# Patient Record
Sex: FEMALE | Race: WHITE | Employment: FULL TIME | URBAN - NONMETROPOLITAN AREA
[De-identification: names, ages, dates, MRNs, and addresses within clinical notes are randomized per-mention and may not be internally consistent; named-entity substitution may affect disease eponyms.]

---

## 2017-12-07 ENCOUNTER — HOSPITAL ENCOUNTER (EMERGENCY)
Dept: CT IMAGING | Age: 42
Discharge: HOME | End: 2017-12-07
Payer: COMMERCIAL

## 2017-12-07 ENCOUNTER — HOSPITAL ENCOUNTER (EMERGENCY)
Age: 42
Discharge: HOME | End: 2017-12-07
Payer: COMMERCIAL

## 2017-12-07 VITALS
RESPIRATION RATE: 18 BRPM | BODY MASS INDEX: 24.8 KG/M2 | WEIGHT: 140 LBS | DIASTOLIC BLOOD PRESSURE: 74 MMHG | HEART RATE: 101 BPM | TEMPERATURE: 98.6 F | HEIGHT: 63 IN | OXYGEN SATURATION: 97 % | SYSTOLIC BLOOD PRESSURE: 137 MMHG

## 2017-12-07 DIAGNOSIS — N30.01 ACUTE CYSTITIS WITH HEMATURIA: Primary | ICD-10-CM

## 2017-12-07 LAB
BASOPHILS # BLD: 0.2 %
BASOPHILS ABSOLUTE: 0 THOU/MM3 (ref 0–0.1)
BILIRUBIN URINE: ABNORMAL
BLOOD, URINE: ABNORMAL
BUN, WHOLE BLOOD: 11 MG/DL (ref 8–26)
CHARACTER, URINE: ABNORMAL
CHLORIDE, WHOLE BLOOD: 99 MEQ/L (ref 98–109)
COLOR: ABNORMAL
CREAT SERPL-MCNC: 0.8 MG/DL (ref 0.5–1.2)
EOSINOPHIL # BLD: 0.1 %
EOSINOPHILS ABSOLUTE: 0 THOU/MM3 (ref 0–0.4)
GFR, ESTIMATED: 84 ML/MIN/1.73M2
GLUCOSE, URINE: NEGATIVE MG/DL
GLUCOSE, WHOLE BLOOD: 146 MG/DL (ref 70–108)
HCT VFR BLD CALC: 40.8 % (ref 37–47)
HEMOGLOBIN: 14.1 GM/DL (ref 12–16)
KETONES, URINE: 15
LEUKOCYTES, UA: ABNORMAL
LYMPHOCYTES # BLD: 3 %
LYMPHOCYTES ABSOLUTE: 0.5 THOU/MM3 (ref 1–4.8)
MCH RBC QN AUTO: 32 PG (ref 27–31)
MCHC RBC AUTO-ENTMCNC: 34.5 GM/DL (ref 33–37)
MCV RBC AUTO: 93 FL (ref 81–99)
MONOCYTES # BLD: 3.1 %
MONOCYTES ABSOLUTE: 0.5 THOU/MM3 (ref 0.4–1.3)
NITRITE, URINE: NEGATIVE
NUCLEATED RED BLOOD CELLS: 0 /100 WBC
PDW BLD-RTO: 11.5 % (ref 11.5–14.5)
PH UA: 5.5 (ref 5–9)
PLATELET # BLD: 179 THOU/MM3 (ref 130–400)
PMV BLD AUTO: 7.9 MCM (ref 7.4–10.4)
POTASSIUM, WHOLE BLOOD: 3.2 MEQ/L (ref 3.5–4.9)
PROTEIN UA: 100 MG/DL
RBC # BLD: 4.4 MILL/MM3 (ref 4.2–5.4)
REFLEX TO URINE C & S: ABNORMAL
SEG NEUTROPHILS: 93.6 %
SEGMENTED NEUTROPHILS ABSOLUTE COUNT: 14.4 THOU/MM3 (ref 1.8–7.7)
SODIUM BLD-SCNC: 137 MEQ/L (ref 138–146)
SPECIFIC GRAVITY UA: >= 1.03 (ref 1–1.03)
TOTAL CO2, WHOLE BLOOD: 27 MEQ/L (ref 23–33)
UROBILINOGEN, URINE: 2 EU/DL (ref 0–1)
WBC # BLD: 15.4 THOU/MM3 (ref 4.8–10.8)

## 2017-12-07 PROCEDURE — 2580000003 HC RX 258: Performed by: NURSE PRACTITIONER

## 2017-12-07 PROCEDURE — 99213 OFFICE O/P EST LOW 20 MIN: CPT | Performed by: NURSE PRACTITIONER

## 2017-12-07 PROCEDURE — 74177 CT ABD & PELVIS W/CONTRAST: CPT

## 2017-12-07 PROCEDURE — 99215 OFFICE O/P EST HI 40 MIN: CPT

## 2017-12-07 PROCEDURE — 85025 COMPLETE CBC W/AUTO DIFF WBC: CPT

## 2017-12-07 PROCEDURE — 87086 URINE CULTURE/COLONY COUNT: CPT

## 2017-12-07 PROCEDURE — 82947 ASSAY GLUCOSE BLOOD QUANT: CPT

## 2017-12-07 PROCEDURE — 84520 ASSAY OF UREA NITROGEN: CPT

## 2017-12-07 PROCEDURE — 6360000002 HC RX W HCPCS: Performed by: NURSE PRACTITIONER

## 2017-12-07 PROCEDURE — 82565 ASSAY OF CREATININE: CPT

## 2017-12-07 PROCEDURE — 36415 COLL VENOUS BLD VENIPUNCTURE: CPT

## 2017-12-07 PROCEDURE — 80051 ELECTROLYTE PANEL: CPT

## 2017-12-07 PROCEDURE — 81003 URINALYSIS AUTO W/O SCOPE: CPT

## 2017-12-07 PROCEDURE — 96374 THER/PROPH/DIAG INJ IV PUSH: CPT

## 2017-12-07 PROCEDURE — 96375 TX/PRO/DX INJ NEW DRUG ADDON: CPT

## 2017-12-07 PROCEDURE — 6360000004 HC RX CONTRAST MEDICATION: Performed by: NURSE PRACTITIONER

## 2017-12-07 RX ORDER — KETOROLAC TROMETHAMINE 30 MG/ML
30 INJECTION, SOLUTION INTRAMUSCULAR; INTRAVENOUS ONCE
Status: COMPLETED | OUTPATIENT
Start: 2017-12-07 | End: 2017-12-07

## 2017-12-07 RX ORDER — NITROFURANTOIN 25; 75 MG/1; MG/1
100 CAPSULE ORAL 2 TIMES DAILY
Qty: 14 CAPSULE | Refills: 0 | Status: SHIPPED | OUTPATIENT
Start: 2017-12-07 | End: 2017-12-07

## 2017-12-07 RX ORDER — ONDANSETRON 2 MG/ML
4 INJECTION INTRAMUSCULAR; INTRAVENOUS ONCE
Status: COMPLETED | OUTPATIENT
Start: 2017-12-07 | End: 2017-12-07

## 2017-12-07 RX ORDER — LEVOFLOXACIN 500 MG/1
500 TABLET, FILM COATED ORAL DAILY
Qty: 7 TABLET | Refills: 0 | Status: SHIPPED | OUTPATIENT
Start: 2017-12-07 | End: 2017-12-14

## 2017-12-07 RX ORDER — AZITHROMYCIN 250 MG/1
TABLET, FILM COATED ORAL
Qty: 6 TABLET | Refills: 0 | Status: SHIPPED | OUTPATIENT
Start: 2017-12-07 | End: 2017-12-07

## 2017-12-07 RX ADMIN — IOHEXOL 50 ML: 240 INJECTION, SOLUTION INTRATHECAL; INTRAVASCULAR; INTRAVENOUS; ORAL at 16:19

## 2017-12-07 RX ADMIN — ONDANSETRON 4 MG: 2 INJECTION INTRAMUSCULAR; INTRAVENOUS at 14:57

## 2017-12-07 RX ADMIN — IOPAMIDOL 85 ML: 755 INJECTION, SOLUTION INTRAVENOUS at 16:19

## 2017-12-07 RX ADMIN — CEFTRIAXONE 1 G: 1 INJECTION, POWDER, FOR SOLUTION INTRAMUSCULAR; INTRAVENOUS at 15:20

## 2017-12-07 RX ADMIN — KETOROLAC TROMETHAMINE 30 MG: 30 INJECTION, SOLUTION INTRAMUSCULAR at 14:57

## 2017-12-07 ASSESSMENT — ENCOUNTER SYMPTOMS
RECTAL PAIN: 0
SHORTNESS OF BREATH: 0
ABDOMINAL PAIN: 1
ANAL BLEEDING: 0
CONSTIPATION: 0
VOMITING: 0
NAUSEA: 1
BLOOD IN STOOL: 0
DIARRHEA: 0
SORE THROAT: 0
WHEEZING: 0
COUGH: 0

## 2017-12-07 ASSESSMENT — PAIN SCALES - GENERAL
PAINLEVEL_OUTOF10: 10

## 2017-12-07 ASSESSMENT — PAIN DESCRIPTION - LOCATION: LOCATION: ABDOMEN

## 2017-12-07 ASSESSMENT — PAIN DESCRIPTION - DESCRIPTORS: DESCRIPTORS: ACHING;STABBING

## 2017-12-07 ASSESSMENT — PAIN DESCRIPTION - ORIENTATION: ORIENTATION: RIGHT;LOWER;MID

## 2017-12-07 ASSESSMENT — PAIN DESCRIPTION - PAIN TYPE: TYPE: ACUTE PAIN

## 2017-12-07 NOTE — ED PROVIDER NOTES
[address]  Urgent Care Encounter      CHIEF COMPLAINT       Chief Complaint   Patient presents with    Abdominal Pain       Nurses Notes reviewed and I agree except as noted in the HPI. HISTORY OF PRESENT ILLNESS   Abelino Spann is a 40 y.o. female who presents With onset of abdominal pain mostly in the right lower quadrant, and nausea starting yesterday. States she has not been able to eat all day, but she is drinking well. Denies vomiting or diarrhea, constipation, unusual flatulence or belching. She has no dysuria, urinary frequency or burning, no hematuria, no vaginal discharge. She has an IUD in place and has not had a period for quite some time. REVIEW OF SYSTEMS     Review of Systems   Constitutional: Negative for appetite change, diaphoresis, fatigue and fever. HENT: Negative for congestion, sore throat and tinnitus. Eyes: Negative for visual disturbance. Respiratory: Negative for cough, shortness of breath and wheezing. Cardiovascular: Negative for chest pain and leg swelling. Gastrointestinal: Positive for abdominal pain and nausea. Negative for anal bleeding, blood in stool, constipation, diarrhea, rectal pain and vomiting. Genitourinary: Negative for frequency. Musculoskeletal: Negative for arthralgias, myalgias and neck stiffness. Skin: Negative for rash. Neurological: Negative for dizziness, weakness and headaches. Psychiatric/Behavioral: The patient is not nervous/anxious. All other systems reviewed and are negative. PAST MEDICAL HISTORY         Diagnosis Date    Abnormal Pap smear     Depression     Mental disorder     history of depression       SURGICAL HISTORY     Patient  has a past surgical history that includes knee surgery (2007). CURRENT MEDICATIONS       Previous Medications    ACETAMINOPHEN 650 MG TABS    Take 650 mg by mouth every 4 hours as needed for Pain (For mild pain level 1-3 or for fever > 100.5). Abdominal: Soft. Bowel sounds are normal. She exhibits no distension and no mass. There is tenderness. There is rebound and guarding. Musculoskeletal: Normal range of motion. She exhibits no edema or tenderness. Neurological: She is alert and oriented to person, place, and time. She displays no tremor. Coordination normal. GCS eye subscore is 4. GCS verbal subscore is 5. GCS motor subscore is 6. Skin: Skin is warm and dry. No rash noted. She is not diaphoretic. No erythema. No pallor. Psychiatric: She has a normal mood and affect. Her speech is normal and behavior is normal. Judgment and thought content normal. Cognition and memory are normal.   Nursing note and vitals reviewed.       DIAGNOSTIC RESULTS   Labs:  Results for orders placed or performed during the hospital encounter of 12/07/17   UA without Microscopic Reflex C&S   Result Value Ref Range    Glucose, Urine Negative NEGATIVE mg/dl    Bilirubin Urine Moderate (A) NEGATIVE    Ketones, Urine 15 (A) NEGATIVE    Specific Gravity, UA >=1.030 1.002 - 1.03    Blood, Urine Large (A) NEGATIVE    pH, UA 5.50 5.0 - 9.0    Protein,  (A) NEGATIVE mg/dl    Urobilinogen, Urine 2.00 0.0 - 1.0 eu/dl    Nitrite, Urine Negative NEGATIVE    LEUKOCYTES, UA Small (A) NEGATIVE    Color, UA Other STRAW-YELL    Character, Urine Cloudy (A) CLEAR-SL C    REFLEX TO URINE C & S INDICATED    CBC Auto Differential   Result Value Ref Range    WBC 15.4 (H) 4.8 - 10.8 thou/mm3    RBC 4.40 4.20 - 5.40 mill/mm3    Hemoglobin 14.1 12.0 - 16.0 gm/dl    Hematocrit 40.8 37.0 - 47.0 %    MCV 93 81 - 99 fL    MCH 32.0 (H) 27.0 - 31.0 pg    MCHC 34.5 33.0 - 37.0 gm/dl    RDW 11.5 11.5 - 14.5 %    Platelets 562 488 - 068 thou/mm3    MPV 7.9 7.4 - 10.4 mcm   POC Basic Metabol Panel without Calcium   Result Value Ref Range    Sodium 137 (L) 138 - 146 meq/l    Potassium, Whole Blood 3.2 (L) 3.5 - 4.9 meq/l    Chloride, Whole Blood 99 98 - 109 meq/l    TOTAL CO2, WHOLE BLOOD 27 23 - 33 meq/l    Glucose, Whole Blood 146 (H) 70 - 108 mg/dl    BUN, WHOLE BLOOD 11 8 - 26 mg/dl    CREATININE 0.8 0.5 - 1.2 mg/dl   Glomerular Filtration Rate, Estimated   Result Value Ref Range    GFR, Estimated 84 ml/min/1.73m2       IMAGING:  CT ABDOMEN PELVIS W IV CONTRAST   Final Result   NO CT EVIDENCE OF APPENDICITIS. NONSPECIFIC CHARACTERISTICS WITH RESPECT TO THE COMPLAINTS OF RIGHT LOWER QUADRANT PAIN. INCIDENTAL FINDINGS OF SIGMOID COLONIC DIVERTICULOSIS, POSSIBLE LEFT OVARIAN CYST, AS WELL AS INTRAUTERINE DEVICE. BORDERLINE HEPATOMEGALY ALSO. **This report has been created using voice recognition software. It may contain minor errors which are inherent in voice recognition technology. **            Final report electronically signed by Dr. Makeda Fonseca on 12/7/2017 4:28 PM          URGENT CARE COURSE:     Vitals:    12/07/17 1429   BP: 137/74   Pulse: 101   Resp: 18   Temp: 98.6 °F (37 °C)   TempSrc: Oral   SpO2: 97%   Weight: 140 lb (63.5 kg)   Height: 5' 3\" (1.6 m)       Patient presents in obvious pain, lying on her left side complaining of pain in the right lower quadrant. She has her right knee pulled up to her chest.  Her most intensive pain is at McBurney's point, but she does have pain generally throughout her abdomen. Skin is warm and dry and she is afebrile. Bowel sounds are mildly hyperactive. Positive heeltap sign. Urinalysis indicates UTI and presence of bilirubin. White blood count-15.4  Sodium 137, potassium 3.2    Patient is reassessed to find her pain is unchanged, she continues to have right lower quadrant pain, but she does have associated CVA tenderness on the right side now. The rest of her abdomen is also generally tender to palpation. States she has had UTIs in the past but never anything like this. Patient is submitted to a CT scan of the abdomen to ensure there is no appendicitis, due to her presentation. CT results were not positive for appendicitis.

## 2017-12-07 NOTE — ED NOTES
Patient verbalized understanding of discharge instructions and medications prescribed. Denies questions or concerns at this time.       Edwige Grace RN  12/07/17 9589

## 2017-12-08 ENCOUNTER — NURSE TRIAGE (OUTPATIENT)
Dept: ADMINISTRATIVE | Age: 42
End: 2017-12-08

## 2017-12-09 LAB
ORGANISM: ABNORMAL
URINE CULTURE REFLEX: ABNORMAL

## 2017-12-26 ENCOUNTER — HOSPITAL ENCOUNTER (INPATIENT)
Age: 42
LOS: 5 days | Discharge: HOME | DRG: 389 | End: 2017-12-31
Attending: INTERNAL MEDICINE | Admitting: INTERNAL MEDICINE
Payer: COMMERCIAL

## 2017-12-26 ENCOUNTER — APPOINTMENT (OUTPATIENT)
Dept: CT IMAGING | Age: 42
DRG: 389 | End: 2017-12-26
Payer: COMMERCIAL

## 2017-12-26 DIAGNOSIS — K52.9 ENTERITIS: Primary | ICD-10-CM

## 2017-12-26 DIAGNOSIS — N28.9 ACUTE RENAL INSUFFICIENCY: ICD-10-CM

## 2017-12-26 PROBLEM — N17.9 AKI (ACUTE KIDNEY INJURY) (HCC): Status: ACTIVE | Noted: 2017-12-26

## 2017-12-26 PROBLEM — K56.7 ILEUS (HCC): Status: ACTIVE | Noted: 2017-12-26

## 2017-12-26 LAB
ADENOVIRUS F 40 41 PCR: NOT DETECTED
ALBUMIN SERPL-MCNC: 4.3 G/DL (ref 3.5–5.1)
ALP BLD-CCNC: 93 U/L (ref 38–126)
ALT SERPL-CCNC: 7 U/L (ref 11–66)
AMORPHOUS: ABNORMAL
AMPHETAMINE+METHAMPHETAMINE URINE SCREEN: NEGATIVE
ANION GAP SERPL CALCULATED.3IONS-SCNC: 20 MEQ/L (ref 8–16)
ANISOCYTOSIS: ABNORMAL
AST SERPL-CCNC: 10 U/L (ref 5–40)
ASTROVIRUS PCR: NOT DETECTED
BACTERIA: ABNORMAL /HPF
BANDED NEUTROPHILS ABSOLUTE COUNT: 1.4 THOU/MM3
BANDS PRESENT: 6 %
BARBITURATE QUANTITATIVE URINE: NEGATIVE
BASOPHILS # BLD: 0 %
BASOPHILS ABSOLUTE: 0 THOU/MM3 (ref 0–0.1)
BENZODIAZEPINE QUANTITATIVE URINE: NEGATIVE
BILIRUB SERPL-MCNC: 0.5 MG/DL (ref 0.3–1.2)
BILIRUBIN DIRECT: < 0.2 MG/DL (ref 0–0.3)
BILIRUBIN URINE: ABNORMAL
BLOOD, URINE: ABNORMAL
BUN BLDV-MCNC: 35 MG/DL (ref 7–22)
C-REACTIVE PROTEIN: 56.39 MG/DL (ref 0–1)
CALCIUM SERPL-MCNC: 9.9 MG/DL (ref 8.5–10.5)
CAMPYLOBACTER PCR: NOT DETECTED
CANNABINOID QUANTITATIVE URINE: NEGATIVE
CASTS UA: ABNORMAL /LPF
CHARACTER, URINE: ABNORMAL
CHLORIDE BLD-SCNC: 98 MEQ/L (ref 98–111)
CLOSTRIDIUM DIFFICILE, PCR: NOT DETECTED
CO2: 18 MEQ/L (ref 23–33)
COCAINE METABOLITE QUANTITATIVE URINE: NEGATIVE
COLOR: YELLOW
CREAT SERPL-MCNC: 1.8 MG/DL (ref 0.4–1.2)
CRYPTOSPORIDIUM PCR: NOT DETECTED
CRYSTALS, UA: ABNORMAL
CYCLOSPORA CAYETANENSIS PCR: NOT DETECTED
DIFFERENTIAL, MANUAL: NORMAL
E COLI 0157 PCR: NORMAL
E COLI ENTEROAGGREGATIVE PCR: NOT DETECTED
E COLI ENTEROPATHOGENIC PCR: NOT DETECTED
E COLI ENTEROTOXIGENIC PCR: NOT DETECTED
E COLI SHIGA LIKE TOXIN PCR: NOT DETECTED
E COLI SHIGELLA/ENTEROINVASIVE PCR: NOT DETECTED
E HISTOLYTICA GI FILM ARRAY: NOT DETECTED
EOSINOPHIL # BLD: 0 %
EOSINOPHILS ABSOLUTE: 0 THOU/MM3 (ref 0–0.4)
EPITHELIAL CELLS, UA: ABNORMAL /HPF
ETHYL ALCOHOL, SERUM: < 0.01 %
GFR SERPL CREATININE-BSD FRML MDRD: 31 ML/MIN/1.73M2
GIARDIA LAMBLIA PCR: NOT DETECTED
GLUCOSE BLD-MCNC: 140 MG/DL (ref 70–108)
GLUCOSE URINE: NEGATIVE MG/DL
HCT VFR BLD CALC: 45.8 % (ref 37–47)
HEMOGLOBIN: 15.6 GM/DL (ref 12–16)
ICTOTEST: NEGATIVE
KETONES, URINE: NEGATIVE
LEUKOCYTE ESTERASE, URINE: ABNORMAL
LIPASE: 6.3 U/L (ref 5.6–51.3)
LYMPHOCYTES # BLD: 7 %
LYMPHOCYTES ABSOLUTE: 1.7 THOU/MM3 (ref 1–4.8)
MAGNESIUM: 2.2 MG/DL (ref 1.6–2.4)
MCH RBC QN AUTO: 31.2 PG (ref 27–31)
MCHC RBC AUTO-ENTMCNC: 34 GM/DL (ref 33–37)
MCV RBC AUTO: 91.9 FL (ref 81–99)
MONOCYTES # BLD: 1 %
MONOCYTES ABSOLUTE: 0.2 THOU/MM3 (ref 0.4–1.3)
MUCUS: ABNORMAL
NITRITE, URINE: NEGATIVE
NOROVIRUS GI GII PCR: NOT DETECTED
NUCLEATED RED BLOOD CELLS: 0 /100 WBC
OPIATES, URINE: NEGATIVE
OSMOLALITY CALCULATION: 282.2 MOSMOL/KG (ref 275–300)
OXYCODONE: NEGATIVE
PDW BLD-RTO: 14.6 % (ref 11.5–14.5)
PH UA: 5
PHENCYCLIDINE QUANTITATIVE URINE: NEGATIVE
PLATELET # BLD: 238 THOU/MM3 (ref 130–400)
PLATELET ESTIMATE: ADEQUATE
PLESIOMONAS SHIGELLOIDES PCR: NOT DETECTED
PMV BLD AUTO: 9.2 MCM (ref 7.4–10.4)
POTASSIUM SERPL-SCNC: 3.7 MEQ/L (ref 3.5–5.2)
PROTEIN UA: 30
RBC # BLD: 4.98 MILL/MM3 (ref 4.2–5.4)
RBC URINE: ABNORMAL /HPF
ROTAVIRUS A PCR: NOT DETECTED
SALMONELLA PCR: NOT DETECTED
SAPOVIRUS PCR: NOT DETECTED
SEG NEUTROPHILS: 86 %
SEGMENTED NEUTROPHILS ABSOLUTE COUNT: 20.5 THOU/MM3 (ref 1.8–7.7)
SODIUM BLD-SCNC: 136 MEQ/L (ref 135–145)
SPECIFIC GRAVITY, URINE: 1.02 (ref 1–1.03)
TOTAL PROTEIN: 8.3 G/DL (ref 6.1–8)
UROBILINOGEN, URINE: 0.2 EU/DL
VIBRIO CHOLERAE PCR: NOT DETECTED
VIBRIO PCR: NOT DETECTED
WBC # BLD: 23.8 THOU/MM3 (ref 4.8–10.8)
WBC UA: ABNORMAL /HPF
YERSINIA ENTEROCOLITICA PCR: NOT DETECTED

## 2017-12-26 PROCEDURE — 87086 URINE CULTURE/COLONY COUNT: CPT

## 2017-12-26 PROCEDURE — 81001 URINALYSIS AUTO W/SCOPE: CPT

## 2017-12-26 PROCEDURE — 36415 COLL VENOUS BLD VENIPUNCTURE: CPT

## 2017-12-26 PROCEDURE — 6360000002 HC RX W HCPCS: Performed by: INTERNAL MEDICINE

## 2017-12-26 PROCEDURE — 82248 BILIRUBIN DIRECT: CPT

## 2017-12-26 PROCEDURE — 99285 EMERGENCY DEPT VISIT HI MDM: CPT

## 2017-12-26 PROCEDURE — 99222 1ST HOSP IP/OBS MODERATE 55: CPT | Performed by: INTERNAL MEDICINE

## 2017-12-26 PROCEDURE — 6370000000 HC RX 637 (ALT 250 FOR IP): Performed by: INTERNAL MEDICINE

## 2017-12-26 PROCEDURE — 6360000004 HC RX CONTRAST MEDICATION: Performed by: NURSE PRACTITIONER

## 2017-12-26 PROCEDURE — 83735 ASSAY OF MAGNESIUM: CPT

## 2017-12-26 PROCEDURE — G0480 DRUG TEST DEF 1-7 CLASSES: HCPCS

## 2017-12-26 PROCEDURE — 2500000003 HC RX 250 WO HCPCS: Performed by: INTERNAL MEDICINE

## 2017-12-26 PROCEDURE — 74177 CT ABD & PELVIS W/CONTRAST: CPT

## 2017-12-26 PROCEDURE — 2580000003 HC RX 258: Performed by: NURSE PRACTITIONER

## 2017-12-26 PROCEDURE — 80307 DRUG TEST PRSMV CHEM ANLYZR: CPT

## 2017-12-26 PROCEDURE — 85025 COMPLETE CBC W/AUTO DIFF WBC: CPT

## 2017-12-26 PROCEDURE — 6360000002 HC RX W HCPCS: Performed by: NURSE PRACTITIONER

## 2017-12-26 PROCEDURE — 83690 ASSAY OF LIPASE: CPT

## 2017-12-26 PROCEDURE — S0028 INJECTION, FAMOTIDINE, 20 MG: HCPCS | Performed by: INTERNAL MEDICINE

## 2017-12-26 PROCEDURE — 86140 C-REACTIVE PROTEIN: CPT

## 2017-12-26 PROCEDURE — 6370000000 HC RX 637 (ALT 250 FOR IP): Performed by: NURSE PRACTITIONER

## 2017-12-26 PROCEDURE — 1200000000 HC SEMI PRIVATE

## 2017-12-26 PROCEDURE — 80053 COMPREHEN METABOLIC PANEL: CPT

## 2017-12-26 PROCEDURE — 87507 IADNA-DNA/RNA PROBE TQ 12-25: CPT

## 2017-12-26 RX ORDER — 0.9 % SODIUM CHLORIDE 0.9 %
500 INTRAVENOUS SOLUTION INTRAVENOUS ONCE
Status: COMPLETED | OUTPATIENT
Start: 2017-12-26 | End: 2017-12-26

## 2017-12-26 RX ORDER — ONDANSETRON 2 MG/ML
4 INJECTION INTRAMUSCULAR; INTRAVENOUS EVERY 6 HOURS PRN
Status: DISCONTINUED | OUTPATIENT
Start: 2017-12-26 | End: 2017-12-31 | Stop reason: HOSPADM

## 2017-12-26 RX ORDER — PAROXETINE HYDROCHLORIDE 20 MG/1
10 TABLET, FILM COATED ORAL DAILY
Status: DISCONTINUED | OUTPATIENT
Start: 2017-12-26 | End: 2017-12-31 | Stop reason: HOSPADM

## 2017-12-26 RX ORDER — METHYLPREDNISOLONE SODIUM SUCCINATE 40 MG/ML
40 INJECTION, POWDER, LYOPHILIZED, FOR SOLUTION INTRAMUSCULAR; INTRAVENOUS DAILY
Status: DISCONTINUED | OUTPATIENT
Start: 2017-12-26 | End: 2017-12-27

## 2017-12-26 RX ORDER — SODIUM CHLORIDE 9 MG/ML
INJECTION, SOLUTION INTRAVENOUS CONTINUOUS
Status: DISCONTINUED | OUTPATIENT
Start: 2017-12-26 | End: 2017-12-26

## 2017-12-26 RX ORDER — HYDROCODONE BITARTRATE AND ACETAMINOPHEN 5; 325 MG/1; MG/1
1 TABLET ORAL ONCE
Status: COMPLETED | OUTPATIENT
Start: 2017-12-26 | End: 2017-12-26

## 2017-12-26 RX ORDER — MORPHINE SULFATE 2 MG/ML
2 INJECTION, SOLUTION INTRAMUSCULAR; INTRAVENOUS
Status: DISPENSED | OUTPATIENT
Start: 2017-12-26 | End: 2017-12-27

## 2017-12-26 RX ORDER — CIPROFLOXACIN 2 MG/ML
400 INJECTION, SOLUTION INTRAVENOUS EVERY 24 HOURS
Status: DISCONTINUED | OUTPATIENT
Start: 2017-12-27 | End: 2017-12-26

## 2017-12-26 RX ORDER — SODIUM CHLORIDE 0.9 % (FLUSH) 0.9 %
10 SYRINGE (ML) INJECTION EVERY 12 HOURS SCHEDULED
Status: DISCONTINUED | OUTPATIENT
Start: 2017-12-26 | End: 2017-12-31 | Stop reason: HOSPADM

## 2017-12-26 RX ORDER — METRONIDAZOLE 500 MG/1
500 TABLET ORAL ONCE
Status: DISCONTINUED | OUTPATIENT
Start: 2017-12-26 | End: 2017-12-26

## 2017-12-26 RX ORDER — ACETAMINOPHEN 325 MG/1
650 TABLET ORAL EVERY 4 HOURS PRN
Status: DISCONTINUED | OUTPATIENT
Start: 2017-12-26 | End: 2017-12-31 | Stop reason: HOSPADM

## 2017-12-26 RX ORDER — SODIUM CHLORIDE 9 MG/ML
INJECTION, SOLUTION INTRAVENOUS CONTINUOUS
Status: ACTIVE | OUTPATIENT
Start: 2017-12-26 | End: 2017-12-27

## 2017-12-26 RX ORDER — POTASSIUM CHLORIDE 20 MEQ/1
40 TABLET, EXTENDED RELEASE ORAL PRN
Status: DISCONTINUED | OUTPATIENT
Start: 2017-12-26 | End: 2017-12-31 | Stop reason: HOSPADM

## 2017-12-26 RX ORDER — HYDROCODONE BITARTRATE AND ACETAMINOPHEN 5; 325 MG/1; MG/1
1 TABLET ORAL EVERY 4 HOURS PRN
Status: DISCONTINUED | OUTPATIENT
Start: 2017-12-26 | End: 2017-12-31 | Stop reason: HOSPADM

## 2017-12-26 RX ORDER — CIPROFLOXACIN 2 MG/ML
400 INJECTION, SOLUTION INTRAVENOUS ONCE
Status: DISCONTINUED | OUTPATIENT
Start: 2017-12-26 | End: 2017-12-26

## 2017-12-26 RX ORDER — POTASSIUM CHLORIDE 20MEQ/15ML
40 LIQUID (ML) ORAL PRN
Status: DISCONTINUED | OUTPATIENT
Start: 2017-12-26 | End: 2017-12-31 | Stop reason: HOSPADM

## 2017-12-26 RX ORDER — 0.9 % SODIUM CHLORIDE 0.9 %
1000 INTRAVENOUS SOLUTION INTRAVENOUS ONCE
Status: COMPLETED | OUTPATIENT
Start: 2017-12-26 | End: 2017-12-26

## 2017-12-26 RX ORDER — SODIUM CHLORIDE 0.9 % (FLUSH) 0.9 %
10 SYRINGE (ML) INJECTION PRN
Status: DISCONTINUED | OUTPATIENT
Start: 2017-12-26 | End: 2017-12-31 | Stop reason: HOSPADM

## 2017-12-26 RX ORDER — POTASSIUM CHLORIDE 7.45 MG/ML
10 INJECTION INTRAVENOUS PRN
Status: DISCONTINUED | OUTPATIENT
Start: 2017-12-26 | End: 2017-12-31 | Stop reason: HOSPADM

## 2017-12-26 RX ORDER — DICYCLOMINE HYDROCHLORIDE 10 MG/1
10 CAPSULE ORAL ONCE
Status: COMPLETED | OUTPATIENT
Start: 2017-12-26 | End: 2017-12-26

## 2017-12-26 RX ADMIN — DICYCLOMINE HYDROCHLORIDE 10 MG: 10 CAPSULE ORAL at 10:23

## 2017-12-26 RX ADMIN — SODIUM CHLORIDE 1000 ML: 9 INJECTION, SOLUTION INTRAVENOUS at 11:43

## 2017-12-26 RX ADMIN — METHYLPREDNISOLONE SODIUM SUCCINATE 40 MG: 40 INJECTION, POWDER, FOR SOLUTION INTRAMUSCULAR; INTRAVENOUS at 18:18

## 2017-12-26 RX ADMIN — HYDROCODONE BITARTRATE AND ACETAMINOPHEN 1 TABLET: 5; 325 TABLET ORAL at 10:23

## 2017-12-26 RX ADMIN — ONDANSETRON 4 MG: 2 INJECTION INTRAMUSCULAR; INTRAVENOUS at 18:24

## 2017-12-26 RX ADMIN — MORPHINE SULFATE 2 MG: 2 INJECTION, SOLUTION INTRAMUSCULAR; INTRAVENOUS at 20:08

## 2017-12-26 RX ADMIN — CIPROFLOXACIN 400 MG: 2 INJECTION, SOLUTION INTRAVENOUS at 13:09

## 2017-12-26 RX ADMIN — SODIUM CHLORIDE 500 ML: 9 INJECTION, SOLUTION INTRAVENOUS at 10:15

## 2017-12-26 RX ADMIN — SODIUM CHLORIDE: 9 INJECTION, SOLUTION INTRAVENOUS at 13:09

## 2017-12-26 RX ADMIN — Medication 30 ML: at 18:53

## 2017-12-26 RX ADMIN — IOPAMIDOL 80 ML: 755 INJECTION, SOLUTION INTRAVENOUS at 12:12

## 2017-12-26 RX ADMIN — ENOXAPARIN SODIUM 40 MG: 40 INJECTION SUBCUTANEOUS at 16:09

## 2017-12-26 RX ADMIN — PAROXETINE HYDROCHLORIDE 10 MG: 20 TABLET, FILM COATED ORAL at 16:09

## 2017-12-26 RX ADMIN — FAMOTIDINE 20 MG: 10 INJECTION, SOLUTION INTRAVENOUS at 18:18

## 2017-12-26 ASSESSMENT — PAIN DESCRIPTION - PAIN TYPE
TYPE: ACUTE PAIN

## 2017-12-26 ASSESSMENT — PAIN SCALES - GENERAL
PAINLEVEL_OUTOF10: 5
PAINLEVEL_OUTOF10: 5
PAINLEVEL_OUTOF10: 7
PAINLEVEL_OUTOF10: 5
PAINLEVEL_OUTOF10: 6
PAINLEVEL_OUTOF10: 5
PAINLEVEL_OUTOF10: 7
PAINLEVEL_OUTOF10: 5
PAINLEVEL_OUTOF10: 3
PAINLEVEL_OUTOF10: 6

## 2017-12-26 ASSESSMENT — ENCOUNTER SYMPTOMS
SORE THROAT: 0
ABDOMINAL DISTENTION: 0
VOICE CHANGE: 0
CHEST TIGHTNESS: 0
EYE REDNESS: 0
VOMITING: 0
RHINORRHEA: 0
PHOTOPHOBIA: 0
NAUSEA: 0
COUGH: 0
SINUS PRESSURE: 0
ABDOMINAL PAIN: 1
BLOOD IN STOOL: 0
WHEEZING: 0
COLOR CHANGE: 0
CONSTIPATION: 0
SHORTNESS OF BREATH: 0
DIARRHEA: 1
BACK PAIN: 1

## 2017-12-26 ASSESSMENT — PAIN DESCRIPTION - FREQUENCY
FREQUENCY: CONTINUOUS

## 2017-12-26 ASSESSMENT — PAIN DESCRIPTION - DESCRIPTORS
DESCRIPTORS: CONSTANT
DESCRIPTORS: ACHING

## 2017-12-26 ASSESSMENT — PAIN DESCRIPTION - LOCATION
LOCATION: ABDOMEN
LOCATION: HEAD
LOCATION: ABDOMEN

## 2017-12-26 ASSESSMENT — PAIN DESCRIPTION - ORIENTATION: ORIENTATION: RIGHT;LEFT;LOWER;UPPER

## 2017-12-26 NOTE — PROGRESS NOTES
Dr. Marina Meier called this RN and was made aware of consult. He stated that someone from GI group would see patient in the morning.

## 2017-12-26 NOTE — ED PROVIDER NOTES
is unknown.    family history includes Miscarriages / Stillbirths in her maternal grandmother. SOCIAL HISTORY      reports that she has been smoking. She has a 4.50 pack-year smoking history. She has never used smokeless tobacco. She reports that she drinks alcohol. She reports that she does not use drugs. PHYSICAL EXAM     INITIAL VITALS:  height is 5' 3\" (1.6 m) and weight is 139 lb 6.4 oz (63.2 kg). Her oral temperature is 97.7 °F (36.5 °C). Her blood pressure is 113/66 and her pulse is 81. Her respiration is 16 and oxygen saturation is 98%. Physical Exam   Constitutional: She is oriented to person, place, and time. She appears well-developed and well-nourished. HENT:   Head: Normocephalic. Right Ear: External ear normal.   Left Ear: External ear normal.   Mouth/Throat: Uvula is midline and oropharynx is clear and moist.   Eyes: Conjunctivae and EOM are normal. Pupils are equal, round, and reactive to light. Neck: Normal range of motion. Neck supple. Cardiovascular: Normal rate, regular rhythm, S1 normal, S2 normal, normal heart sounds and intact distal pulses. Pulmonary/Chest: Effort normal and breath sounds normal. No respiratory distress. She exhibits no tenderness. Abdominal: Soft. Normal appearance and bowel sounds are normal. She exhibits no distension. There is tenderness (diffuse). Musculoskeletal: Normal range of motion. Lymphadenopathy:     She has no cervical adenopathy. Neurological: She is alert and oriented to person, place, and time. Skin: Skin is warm, dry and intact. Psychiatric: She has a normal mood and affect. Her speech is normal and behavior is normal. Thought content normal.   Nursing note and vitals reviewed.       DIFFERENTIAL DIAGNOSIS:   Infectious diarrhea, colitis, diverticulitis, appendicitis, pancreatitis, cholelithiasis, cholecystitis     DIAGNOSTIC RESULTS     EKG: All EKG's are interpreted by the Emergency Department Physician who either signs or Co-signs this chart in the absence of a cardiologist.     None    RADIOLOGY: non-plain film images(s) such as CT, Ultrasound and MRI are read by the radiologist.  Plain radiographic images are visualized and preliminarily interpreted by the emergency physician unless otherwise stated below. CT ABDOMEN PELVIS W IV CONTRAST Additional Contrast? Oral   Final Result   INTERVAL WORSENING IN THE APPEARANCE OF THE ABDOMEN, PREDOMINATELY WITH RESPECT TO THE BOWEL, NOW WITH FLUID ATTENUATION THROUGH THE MAJORITY OF THE BOWEL LUMEN AS WELL AS MINIMAL DEVELOPMENT OF BOWEL DISTENTION INVOLVING THE SMALL BOWEL,    ESPECIALLY THE JEJUNUM AND ILEUM, DIAMETER OF AS MUCH AS 3.5 CM. CHARACTERISTICS OF ILEUS VERSUS LESS LIKELY SMALL BOWEL OBSTRUCTION. CHARACTERISTICS OF ENTERITIS ALSO. **This report has been created using voice recognition software. It may contain minor errors which are inherent in voice recognition technology. **            Final report electronically signed by Dr. Georgiana Burk on 12/26/2017 12:30 PM      XR ABDOMEN (KUB) (SINGLE AP VIEW)    (Results Pending)         LABS:   Labs Reviewed   CBC WITH AUTO DIFFERENTIAL - Abnormal; Notable for the following:        Result Value    WBC 23.8 (*)     MCH 31.2 (*)     RDW 14.6 (*)     Segs Absolute 20.5 (*)     Monocytes # 0.2 (*)     All other components within normal limits   C-REACTIVE PROTEIN - Abnormal; Notable for the following:     CRP 56.39 (*)     All other components within normal limits   BASIC METABOLIC PANEL - Abnormal; Notable for the following:     CO2 18 (*)     Glucose 140 (*)     BUN 35 (*)     CREATININE 1.8 (*)     All other components within normal limits   HEPATIC FUNCTION PANEL - Abnormal; Notable for the following:     ALT 7 (*)     Total Protein 8.3 (*)     All other components within normal limits   ANION GAP - Abnormal; Notable for the following:      Anion Gap 20.0 (*)     All other components within normal limits   GLOMERULAR

## 2017-12-26 NOTE — H&P
History & Physical      PCP: No primary care provider on file. Date of Admission: 12/26/2017    Date of Service: 12/26/17    Chief Complaint:  Stomach pain    History Of Present Illness:    History obtained from chart review and the patient. 40 y.o. female who presented to [facility] with complaint of abd pain - sharp/cramping in nature and worse with movement. She has had some loose stools, chills, but no nausea or emesis. She previously had a similar episode 19 days ago, went to urgent care, had CT was diagnosed with UTI only and given antibiotics. CT at that time didn't show any inflammation. When seen she notes continued pain as well as some esophageal pain, burning sensation. Past Medical History:          Diagnosis Date    Abnormal Pap smear     Depression     Mental disorder     history of depression       Past Surgical History:          Procedure Laterality Date    KNEE SURGERY  2007       Medications Prior to Admission:      Prior to Admission medications    Medication Sig Start Date End Date Taking? Authorizing Provider   ondansetron (ZOFRAN ODT) 4 MG disintegrating tablet Take 1 tablet by mouth every 4 hours as needed for Nausea 7/5/15   Judith Abbasi MD   PARoxetine (PAXIL) 10 MG tablet Take 10 mg by mouth daily as needed    Historical Provider, MD   acetaminophen 650 MG TABS Take 650 mg by mouth every 4 hours as needed for Pain (For mild pain level 1-3 or for fever > 100.5). 10/21/12   Andi Colin MD   ibuprofen (ADVIL;MOTRIN) 200 MG tablet Take 4 tablets by mouth every 8 hours as needed for Pain (For mild pain level 1-3). 10/21/12   Andi Colin MD       Allergies:  Review of patient's allergies indicates no known allergies. Social History:      The patient currently lives at home    TOBACCO:   reports that she has been smoking. She has a 4.50 pack-year smoking history. She has never used smokeless tobacco.  ETOH:   reports that she drinks alcohol.       Family History:      Reviewed in detail. Positive as follows:        Problem Relation Age of Onset    Miscarriages / Stillbirths Maternal Grandmother     Early Death Neg Hx        REVIEW OF SYSTEMS:   10 point review of system performed, pertinent positives as noted in the HPI, all other systems negative/at baseline. PHYSICAL EXAM:    /79   Pulse 73   Temp 98.1 °F (36.7 °C) (Oral)   Resp 16   Ht 5' 3\" (1.6 m)   Wt 137 lb (62.1 kg)   SpO2 100%   BMI 24.27 kg/m²       General appearance:  No apparent distress, appears stated age and cooperative. HEENT:  Normal cephalic, atraumatic without obvious deformity. Pupils equal, round, and reactive to light. Extra ocular muscles intact. Conjunctivae/corneas clear. Neck: Supple, with full range of motion. No jugular venous distention. Trachea midline. Respiratory:  Normal respiratory effort. Clear to auscultation, bilaterally without Rales/Wheezes/Rhonchi. Cardiovascular:  Regular rate and rhythm with normal S1/S2 without murmurs, rubs or gallops. Abdomen: Soft, non-tender, non-distended with normal bowel sounds. Musculoskeletal:  No clubbing, cyanosis or edema bilaterally. Full range of motion without deformity. Skin: Skin color, texture, turgor normal.  No rashes or lesions. Neurologic:  Neurovascularly intact without any focal sensory/motor deficits. Cranial nerves: II-XII intact, grossly non-focal.  Psychiatric:  Alert and oriented, thought content appropriate, normal insight  Capillary Refill: Brisk,< 3 seconds   Peripheral Pulses: +2 palpable, equal bilaterally       Labs:     Recent Labs      12/26/17   1009   WBC  23.8*   HGB  15.6   HCT  45.8   PLT  238     Recent Labs      12/26/17   1009   NA  136   K  3.7   CL  98   CO2  18*   BUN  35*   CREATININE  1.8*   CALCIUM  9.9     Recent Labs      12/26/17   1009   AST  10   ALT  7*   BILIDIR  <0.2   BILITOT  0.5   ALKPHOS  93     No results for input(s): INR in the last 72 hours.   No results for input(s): Tyshawn Thornton in the last 72 hours. Urinalysis:      Lab Results   Component Value Date    NITRU NEGATIVE 12/26/2017    WBCUA 5-10 12/26/2017    BACTERIA NONE 12/26/2017    RBCUA 15-20 12/26/2017    BLOODU LARGE 12/26/2017    SPECGRAV >=1.030 12/07/2017    GLUCOSEU NEGATIVE 12/26/2017       Radiology:   I have reviewed the imaging studies with the following interpretation:  No results found. Diet:  DIET CLEAR LIQUID;    DVT prophylaxis: [x] Lovenox                                 [] SCDs                                 [] SQ Heparin                                 [] Encourage ambulation           [] Already on Anticoagulation    Code Status: Full Code      PT/OT Eval Status: no    Disposition:    [x] Home       [] TCU       [] Rehab       [] Psych       [] SNF       [] [place]       [] Other-       Assessment and Plan:    Principal Problem:    Enteritis  Active Problems:    Ileus ([facility], [place] [address]        · Admit for enteritis with ileus  · Clear liquid diet as tolerated - if emesis then will go NPO  · KUB in the AM  · Cipro and flagyl, follow GI PCR panel  · IVF NS at 100mL/hr  · PRN morphine or norco  · IV solumedrol (CRP is 50+ but GI panel is unrevealing, leading to possibility of inflammatory bowel disease) and will consult GI  · Heating pad, GI cocktail x1 dose, IV pepcid x1 dose      Thank you No primary care provider on file. for the opportunity to be involved in this patient's care.     Electronically signed by Sergio Paul DO on 12/26/2017 at 12:46 PM

## 2017-12-27 ENCOUNTER — APPOINTMENT (OUTPATIENT)
Dept: GENERAL RADIOLOGY | Age: 42
DRG: 389 | End: 2017-12-27
Payer: COMMERCIAL

## 2017-12-27 LAB
ANION GAP SERPL CALCULATED.3IONS-SCNC: 11 MEQ/L (ref 8–16)
ANISOCYTOSIS: ABNORMAL
BACTERIA: ABNORMAL /HPF
BASOPHILS # BLD: 0 %
BASOPHILS ABSOLUTE: 0 THOU/MM3 (ref 0–0.1)
BILIRUBIN URINE: NEGATIVE
BLOOD, URINE: ABNORMAL
BUN BLDV-MCNC: 19 MG/DL (ref 7–22)
CALCIUM SERPL-MCNC: 8.7 MG/DL (ref 8.5–10.5)
CASTS 2: ABNORMAL /LPF
CASTS UA: ABNORMAL /LPF
CHARACTER, URINE: ABNORMAL
CHLORIDE BLD-SCNC: 102 MEQ/L (ref 98–111)
CO2: 22 MEQ/L (ref 23–33)
COLOR: YELLOW
CREAT SERPL-MCNC: 0.7 MG/DL (ref 0.4–1.2)
CRYSTALS, UA: ABNORMAL
EOSINOPHIL # BLD: 0 %
EOSINOPHILS ABSOLUTE: 0 THOU/MM3 (ref 0–0.4)
EPITHELIAL CELLS, UA: ABNORMAL /HPF
GFR SERPL CREATININE-BSD FRML MDRD: > 90 ML/MIN/1.73M2
GLUCOSE BLD-MCNC: 147 MG/DL (ref 70–108)
GLUCOSE URINE: NEGATIVE MG/DL
HCT VFR BLD CALC: 34 % (ref 37–47)
HCT VFR BLD CALC: 36.9 % (ref 37–47)
HEMOGLOBIN: 11.5 GM/DL (ref 12–16)
HEMOGLOBIN: 12.7 GM/DL (ref 12–16)
KETONES, URINE: NEGATIVE
LEUKOCYTE ESTERASE, URINE: ABNORMAL
LYMPHOCYTES # BLD: 7 %
LYMPHOCYTES ABSOLUTE: 0.6 THOU/MM3 (ref 1–4.8)
MCH RBC QN AUTO: 30.9 PG (ref 27–31)
MCH RBC QN AUTO: 32.1 PG (ref 27–31)
MCHC RBC AUTO-ENTMCNC: 33.8 GM/DL (ref 33–37)
MCHC RBC AUTO-ENTMCNC: 34.4 GM/DL (ref 33–37)
MCV RBC AUTO: 91.4 FL (ref 81–99)
MCV RBC AUTO: 93.3 FL (ref 81–99)
MISCELLANEOUS 2: ABNORMAL
MONOCYTES # BLD: 1.6 %
MONOCYTES ABSOLUTE: 0.1 THOU/MM3 (ref 0.4–1.3)
NITRITE, URINE: NEGATIVE
NUCLEATED RED BLOOD CELLS: 0 /100 WBC
ORGANISM: ABNORMAL
PDW BLD-RTO: 13.9 % (ref 11.5–14.5)
PDW BLD-RTO: 14.6 % (ref 11.5–14.5)
PH UA: 6
PLATELET # BLD: 196 THOU/MM3 (ref 130–400)
PLATELET # BLD: 222 THOU/MM3 (ref 130–400)
PMV BLD AUTO: 9.6 MCM (ref 7.4–10.4)
PMV BLD AUTO: 9.8 MCM (ref 7.4–10.4)
POTASSIUM SERPL-SCNC: 4.3 MEQ/L (ref 3.5–5.2)
PROTEIN UA: ABNORMAL
RBC # BLD: 3.72 MILL/MM3 (ref 4.2–5.4)
RBC # BLD: 3.96 MILL/MM3 (ref 4.2–5.4)
RBC URINE: ABNORMAL /HPF
RENAL EPITHELIAL, UA: ABNORMAL
SEG NEUTROPHILS: 91.4 %
SEGMENTED NEUTROPHILS ABSOLUTE COUNT: 8.3 THOU/MM3 (ref 1.8–7.7)
SODIUM BLD-SCNC: 135 MEQ/L (ref 135–145)
SPECIFIC GRAVITY, URINE: 1.02 (ref 1–1.03)
T4 FREE: 1.04 NG/DL (ref 0.93–1.76)
TSH SERPL DL<=0.05 MIU/L-ACNC: 0.23 UIU/ML (ref 0.4–4.2)
URINE CULTURE REFLEX: ABNORMAL
UROBILINOGEN, URINE: 0.2 EU/DL
WBC # BLD: 9.1 THOU/MM3 (ref 4.8–10.8)
WBC # BLD: 9.1 THOU/MM3 (ref 4.8–10.8)
WBC UA: ABNORMAL /HPF
YEAST: ABNORMAL

## 2017-12-27 PROCEDURE — 2580000003 HC RX 258: Performed by: INTERNAL MEDICINE

## 2017-12-27 PROCEDURE — 87086 URINE CULTURE/COLONY COUNT: CPT

## 2017-12-27 PROCEDURE — 84439 ASSAY OF FREE THYROXINE: CPT

## 2017-12-27 PROCEDURE — 36415 COLL VENOUS BLD VENIPUNCTURE: CPT

## 2017-12-27 PROCEDURE — 74000 XR ABDOMEN LIMITED (KUB): CPT

## 2017-12-27 PROCEDURE — 84443 ASSAY THYROID STIM HORMONE: CPT

## 2017-12-27 PROCEDURE — 99231 SBSQ HOSP IP/OBS SF/LOW 25: CPT | Performed by: INTERNAL MEDICINE

## 2017-12-27 PROCEDURE — A6198 ALGINATE DRESSING > 48 SQ IN: HCPCS

## 2017-12-27 PROCEDURE — 6360000002 HC RX W HCPCS: Performed by: INTERNAL MEDICINE

## 2017-12-27 PROCEDURE — 85025 COMPLETE CBC W/AUTO DIFF WBC: CPT

## 2017-12-27 PROCEDURE — 1200000000 HC SEMI PRIVATE

## 2017-12-27 PROCEDURE — 80048 BASIC METABOLIC PNL TOTAL CA: CPT

## 2017-12-27 PROCEDURE — 85027 COMPLETE CBC AUTOMATED: CPT

## 2017-12-27 PROCEDURE — 81001 URINALYSIS AUTO W/SCOPE: CPT

## 2017-12-27 PROCEDURE — 6370000000 HC RX 637 (ALT 250 FOR IP): Performed by: INTERNAL MEDICINE

## 2017-12-27 RX ADMIN — METHYLPREDNISOLONE SODIUM SUCCINATE 40 MG: 40 INJECTION, POWDER, FOR SOLUTION INTRAMUSCULAR; INTRAVENOUS at 09:44

## 2017-12-27 RX ADMIN — ENOXAPARIN SODIUM 40 MG: 40 INJECTION SUBCUTANEOUS at 18:00

## 2017-12-27 RX ADMIN — SODIUM CHLORIDE: 9 INJECTION, SOLUTION INTRAVENOUS at 05:46

## 2017-12-27 RX ADMIN — WATER 1 G: 1 INJECTION INTRAMUSCULAR; INTRAVENOUS; SUBCUTANEOUS at 20:21

## 2017-12-27 RX ADMIN — HYDROCODONE BITARTRATE AND ACETAMINOPHEN 1 TABLET: 5; 325 TABLET ORAL at 12:23

## 2017-12-27 ASSESSMENT — PAIN SCALES - GENERAL
PAINLEVEL_OUTOF10: 4
PAINLEVEL_OUTOF10: 3
PAINLEVEL_OUTOF10: 2
PAINLEVEL_OUTOF10: 3
PAINLEVEL_OUTOF10: 4

## 2017-12-27 ASSESSMENT — PAIN DESCRIPTION - LOCATION
LOCATION: ABDOMEN
LOCATION: HEAD

## 2017-12-27 ASSESSMENT — PAIN DESCRIPTION - DESCRIPTORS
DESCRIPTORS: CRAMPING
DESCRIPTORS: HEADACHE

## 2017-12-27 ASSESSMENT — PAIN DESCRIPTION - PAIN TYPE
TYPE: ACUTE PAIN

## 2017-12-27 NOTE — PLAN OF CARE
Problem: Pain:  Goal: Pain level will decrease  Pain level will decrease   Outcome: Ongoing  Patient states that pain is alleviated with medication. Pain 8 out of 10 on odilia scale,  Medication brought pain down to 2 out of 10, meeting pain goal  Goal: Control of acute pain  Control of acute pain   Outcome: Ongoing  Acute pain controlled with pain medication  Goal: Control of chronic pain  Control of chronic pain   Outcome: Completed Date Met: 12/27/17      Problem: Infection:  Goal: Will remain free from infection  Will remain free from infection   Outcome: Ongoing  Patient had low grade temperature at 8 pm vitals, afebrile since    Problem: Safety:  Goal: Free from accidental physical injury  Free from accidental physical injury   Outcome: Ongoing  Patient free from accidental injury this shift, up with assistance and skid proof footwear, bed alarm on  Goal: Free from intentional harm  Free from intentional harm   Outcome: Ongoing  Patient free from intentional harm, bed in lowest position, bed alarm on, bedside table, belongings, and call light within reach    Problem: Daily Care:  Goal: Daily care needs are met  Daily care needs are met   Outcome: Ongoing  Daily care needs are being met by staff and patient    Problem: Skin Integrity:  Goal: Skin integrity will stabilize  Skin integrity will stabilize   Outcome: Ongoing  Patient has escoriation on bottom, epc used    Problem: Discharge Planning:  Goal: Patients continuum of care needs are met  Patients continuum of care needs are met   Outcome: Ongoing  Patient to be discharged to home with boyfriend and son    Comments: Care plan reviewed with patient. Patient verbalize understanding of the plan of care and contribute to goal setting.

## 2017-12-27 NOTE — PROGRESS NOTES
Hospitalist Progress Note    Patient:  Scott Corral      Unit/Bed:5E-64/064-A    YOB: 1975    MRN: [id number]       Acct: [de-identified]     PCP: Maria D Simmons CNP    Date of Admission: 12/26/2017    Chief Complaint: abdominal pain , onset several days ago went to urgent care told uti which we see here also     Hospital Course: wbc falling bowel sounds occ. Abdomen ct noted worse distention continue npo but sips and chips only, continue antibiotic therapy for positive ua. Subjective: ileus uti severe. Medications:  Reviewed    Infusion Medications    Scheduled Medications    PARoxetine  10 mg Oral Daily    sodium chloride flush  10 mL Intravenous 2 times per day    enoxaparin  40 mg Subcutaneous Daily     PRN Meds: sodium chloride flush, potassium chloride **OR** potassium chloride **OR** potassium chloride, acetaminophen, magnesium hydroxide, ondansetron, HYDROcodone 5 mg - acetaminophen      Intake/Output Summary (Last 24 hours) at 12/27/17 1749  Last data filed at 12/27/17 0600   Gross per 24 hour   Intake             1950 ml   Output              800 ml   Net             1150 ml       Diet:  Diet NPO Time Specified    Exam:  /63   Pulse 58   Temp 98 °F (36.7 °C) (Axillary)   Resp 16   Ht 5' 3\" (1.6 m)   Wt 139 lb 6.4 oz (63.2 kg)   SpO2 96%   BMI 24.69 kg/m²     General appearance: No apparent distress, appears stated age and cooperative. HEENT: Pupils equal, round, and reactive to light. Conjunctivae/corneas clear. Neck: Supple, with full range of motion. No jugular venous distention. Trachea midline. Respiratory:  Normal respiratory effort. Clear to auscultation, bilaterally without Rales/Wheezes/Rhonchi. Cardiovascular: Regular rate and rhythm with normal S1/S2 without murmurs, rubs or gallops. Abdomen: Soft, non-tender, non-distended with normal bowel sounds. Musculoskeletal: No clubbing, cyanosis or edema bilaterally.   Full range of motion

## 2017-12-27 NOTE — CONSULTS
ulcerations   Neurologic: patient is alert and fully oriented with appropriate affect. DATA REVIEW: WBC:    Recent Labs      12/26/17   1009  12/27/17   0617   WBC  23.8*  9.1   PLT  238  196   HGB  15.6  12.7   HCT  45.8  36.9*       BMP:     Recent Labs      12/26/17   1009  12/27/17   0617   NA  136  135   K  3.7  4.3   CL  98  102   CO2  18*  22*   BUN  35*  19   LABALBU  4.3   --    CREATININE  1.8*  0.7   CALCIUM  9.9  8.7   LABGLOM  31*  >90   GLUCOSE  140*  147*     Hepatic Function Panel:     Recent Labs      12/26/17   1009   ALKPHOS  93   ALT  7*   AST  10   PROT  8.3*   BILITOT  0.5   BILIDIR  <0.2   LABALBU  4.3     Calcium:     Recent Labs      12/27/17   0617   CALCIUM  8.7     PT/INR:   No results for input(s): PROTIME, INR in the last 72 hours. PTT:   No results for input(s): APTT, PTT in the last 72 hours. Recent Labs      12/26/17   1009   MG  2.2   LIPASE  6.3         Impression:  Abdominal pain bilateral lower quadrants. 12-26-17 with ileus vs less likely small bowel obstruction. Characteristics of enteritis also. Diarrhea, GI panel negative this admit   Elevated CRP 56, primary service started solu medrol   Recent UTI treated outpatient with levaquin      Plan: Bowel rest   Await KUB results   Stop solu medrol   Patient will need colonoscopy as further evaluation of abdominal pain and diarrhea. This may be done inpatient vs outpatient pending clinical course and resolution of ileus.    Follow       A&P discussed with Dr Ramesh Hall, CNP

## 2017-12-27 NOTE — CARE COORDINATION
refill/ meds to beds program?  No  Type of Home Care Services:  None  Patient expects to be discharged to:  home  Expected Discharge date:  12/29/17  Follow Up Appointment: Best Day/ Time: Monday AM    Discharge Plan: Spoke with pt this morning. Comes from home with boyfriend and children and no services or DME's. She does not have a PCP but would like to get established with Family Medicine at [address] [id number]. She has transportation and no problems getting meds. Appt arranged for pt to see Nivia Razo NP on Monday 1-8-17 at 7:45 am. Informed pt and she is agreeable to this.         Evaluation: no

## 2017-12-27 NOTE — PROGRESS NOTES
Hospitalist Progress Note    Patient:  Malaika Higuera      Unit/Bed:5E-64/064-A    YOB: 1975    MRN: [id number]       Acct: [de-identified]     PCP: No primary care provider on file. Date of Admission: 12/26/2017    Chief Complaint: abdominal discomfort but not near as severe at this time     Hospital Course: arrived in pain and n/v and bloating. All resolved but bowel sounds are reduced but present     Subjective: ilius diabetic stable at this time         Medications:  Reviewed    Infusion Medications    sodium chloride 100 mL/hr at 12/27/17 0546     Scheduled Medications    PARoxetine  10 mg Oral Daily    sodium chloride flush  10 mL Intravenous 2 times per day    enoxaparin  40 mg Subcutaneous Daily     PRN Meds: sodium chloride flush, potassium chloride **OR** potassium chloride **OR** potassium chloride, acetaminophen, magnesium hydroxide, ondansetron, morphine, HYDROcodone 5 mg - acetaminophen      Intake/Output Summary (Last 24 hours) at 12/27/17 1050  Last data filed at 12/27/17 0600   Gross per 24 hour   Intake             1950 ml   Output              800 ml   Net             1150 ml       Diet:  DIET CLEAR LIQUID;    Exam:  /61   Pulse 58   Temp 97.6 °F (36.4 °C) (Oral)   Resp 16   Ht 5' 3\" (1.6 m)   Wt 139 lb 6.4 oz (63.2 kg)   SpO2 97%   BMI 24.69 kg/m²     General appearance: No apparent distress, appears stated age and cooperative. HEENT: Pupils equal, round, and reactive to light. Conjunctivae/corneas clear. Neck: Supple, with full range of motion. No jugular venous distention. Trachea midline. Respiratory:  Normal respiratory effort. Clear to auscultation, bilaterally without Rales/Wheezes/Rhonchi. Cardiovascular: Regular rate and rhythm with normal S1/S2 without murmurs, rubs or gallops. Abdomen: Soft, non-tender, non-distended with normal bowel sounds. Musculoskeletal: No clubbing, cyanosis or edema bilaterally.   Full range of motion without deformity. Skin: Skin color, texture, turgor normal.  No rashes or lesions. Neurologic:  Neurovascularly intact without any focal sensory/motor deficits. Cranial nerves: II-XII intact, grossly non-focal.  Psychiatric: Alert and oriented, thought content appropriate, normal insight  Capillary Refill: Brisk,< 3 seconds   Peripheral Pulses: +2 palpable, equal bilaterally       Labs:   Recent Labs      12/26/17   1009  12/27/17   0617   WBC  23.8*  9.1   HGB  15.6  12.7   HCT  45.8  36.9*   PLT  238  196     Recent Labs      12/26/17   1009  12/27/17   0617   NA  136  135   K  3.7  4.3   CL  98  102   CO2  18*  22*   BUN  35*  19   CREATININE  1.8*  0.7   CALCIUM  9.9  8.7     Recent Labs      12/26/17   1009   AST  10   ALT  7*   BILIDIR  <0.2   BILITOT  0.5   ALKPHOS  93     No results for input(s): INR in the last 72 hours. No results for input(s): Henrine Binning in the last 72 hours. Urinalysis:    Lab Results   Component Value Date    NITRU NEGATIVE 12/26/2017    WBCUA 5-10 12/26/2017    BACTERIA NONE 12/26/2017    RBCUA 15-20 12/26/2017    BLOODU LARGE 12/26/2017    SPECGRAV >=1.030 12/07/2017    GLUCOSEU NEGATIVE 12/26/2017       Radiology:  XR ABDOMEN (KUB) (SINGLE AP VIEW)   Final Result   Moderate mid abdominal paralytic ileus. **This report has been created using voice recognition software. It may contain minor errors which are inherent in voice recognition technology. **      Final report electronically signed by Dr. Renetta Su on 12/27/2017 9:59 AM      CT ABDOMEN PELVIS W IV CONTRAST Additional Contrast? Oral   Final Result   INTERVAL WORSENING IN THE APPEARANCE OF THE ABDOMEN, PREDOMINATELY WITH RESPECT TO THE BOWEL, NOW WITH FLUID ATTENUATION THROUGH THE MAJORITY OF THE BOWEL LUMEN AS WELL AS MINIMAL DEVELOPMENT OF BOWEL DISTENTION INVOLVING THE SMALL BOWEL,    ESPECIALLY THE JEJUNUM AND ILEUM, DIAMETER OF AS MUCH AS 3.5 CM.  CHARACTERISTICS OF ILEUS VERSUS LESS LIKELY SMALL BOWEL

## 2017-12-28 ENCOUNTER — APPOINTMENT (OUTPATIENT)
Dept: GENERAL RADIOLOGY | Age: 42
DRG: 389 | End: 2017-12-28
Payer: COMMERCIAL

## 2017-12-28 LAB
BACTERIA: ABNORMAL /HPF
BILIRUBIN URINE: NEGATIVE
BLOOD, URINE: ABNORMAL
CASTS 2: ABNORMAL /LPF
CASTS UA: ABNORMAL /LPF
CHARACTER, URINE: CLEAR
COLOR: YELLOW
CRYSTALS, UA: ABNORMAL
EPITHELIAL CELLS, UA: ABNORMAL /HPF
GLUCOSE URINE: NEGATIVE MG/DL
KETONES, URINE: NEGATIVE
LEUKOCYTE ESTERASE, URINE: NEGATIVE
MISCELLANEOUS 2: ABNORMAL
NITRITE, URINE: NEGATIVE
ORGANISM: ABNORMAL
PH UA: 6
PROTEIN UA: NEGATIVE
RBC URINE: ABNORMAL /HPF
RENAL EPITHELIAL, UA: ABNORMAL
SPECIFIC GRAVITY, URINE: 1.03 (ref 1–1.03)
URINE CULTURE REFLEX: ABNORMAL
UROBILINOGEN, URINE: 0.2 EU/DL
WBC UA: ABNORMAL /HPF
YEAST: ABNORMAL

## 2017-12-28 PROCEDURE — A6198 ALGINATE DRESSING > 48 SQ IN: HCPCS

## 2017-12-28 PROCEDURE — 2580000003 HC RX 258: Performed by: INTERNAL MEDICINE

## 2017-12-28 PROCEDURE — 6360000002 HC RX W HCPCS: Performed by: INTERNAL MEDICINE

## 2017-12-28 PROCEDURE — 74000 XR ABDOMEN LIMITED (KUB): CPT

## 2017-12-28 PROCEDURE — 99253 IP/OBS CNSLTJ NEW/EST LOW 45: CPT | Performed by: NURSE PRACTITIONER

## 2017-12-28 PROCEDURE — 6370000000 HC RX 637 (ALT 250 FOR IP): Performed by: NURSE PRACTITIONER

## 2017-12-28 PROCEDURE — 1200000000 HC SEMI PRIVATE

## 2017-12-28 PROCEDURE — 81001 URINALYSIS AUTO W/SCOPE: CPT

## 2017-12-28 PROCEDURE — 6370000000 HC RX 637 (ALT 250 FOR IP): Performed by: INTERNAL MEDICINE

## 2017-12-28 RX ORDER — SODIUM CHLORIDE 9 MG/ML
INJECTION, SOLUTION INTRAVENOUS CONTINUOUS
Status: DISCONTINUED | OUTPATIENT
Start: 2017-12-28 | End: 2017-12-31 | Stop reason: HOSPADM

## 2017-12-28 RX ORDER — SENNA PLUS 8.6 MG/1
15 TABLET ORAL ONCE
Status: COMPLETED | OUTPATIENT
Start: 2017-12-28 | End: 2017-12-28

## 2017-12-28 RX ORDER — POLYETHYLENE GLYCOL 3350 17 G/17G
238 POWDER, FOR SOLUTION ORAL ONCE
Status: COMPLETED | OUTPATIENT
Start: 2017-12-28 | End: 2017-12-28

## 2017-12-28 RX ADMIN — SODIUM CHLORIDE: 9 INJECTION, SOLUTION INTRAVENOUS at 02:02

## 2017-12-28 RX ADMIN — POLYETHYLENE GLYCOL 3350 238 G: 17 POWDER, FOR SOLUTION ORAL at 13:04

## 2017-12-28 RX ADMIN — SODIUM CHLORIDE: 9 INJECTION, SOLUTION INTRAVENOUS at 11:38

## 2017-12-28 RX ADMIN — WATER 1 G: 1 INJECTION INTRAMUSCULAR; INTRAVENOUS; SUBCUTANEOUS at 20:09

## 2017-12-28 RX ADMIN — SODIUM CHLORIDE: 9 INJECTION, SOLUTION INTRAVENOUS at 20:09

## 2017-12-28 RX ADMIN — SENNA 129 MG: 8.6 TABLET, COATED ORAL at 13:01

## 2017-12-28 RX ADMIN — ENOXAPARIN SODIUM 40 MG: 40 INJECTION SUBCUTANEOUS at 14:46

## 2017-12-28 RX ADMIN — PAROXETINE HYDROCHLORIDE 10 MG: 20 TABLET, FILM COATED ORAL at 09:06

## 2017-12-28 ASSESSMENT — PAIN SCALES - GENERAL: PAINLEVEL_OUTOF10: 0

## 2017-12-28 NOTE — PLAN OF CARE
Problem: GI  Goal: No bowel complications  Outcome: Ongoing  Abdomen soft/tender, bowel sounds hyperactive. Patient is passing gas and having regular bowel movements.  Complains of abdominal pain and diarrhea. kub shows mild ileus-will monitor

## 2017-12-29 ENCOUNTER — ANESTHESIA (OUTPATIENT)
Dept: ENDOSCOPY | Age: 42
DRG: 389 | End: 2017-12-29
Payer: COMMERCIAL

## 2017-12-29 ENCOUNTER — ANESTHESIA EVENT (OUTPATIENT)
Dept: ENDOSCOPY | Age: 42
DRG: 389 | End: 2017-12-29
Payer: COMMERCIAL

## 2017-12-29 VITALS — OXYGEN SATURATION: 99 % | DIASTOLIC BLOOD PRESSURE: 78 MMHG | SYSTOLIC BLOOD PRESSURE: 114 MMHG

## 2017-12-29 PROCEDURE — 3700000001 HC ADD 15 MINUTES (ANESTHESIA): Performed by: INTERNAL MEDICINE

## 2017-12-29 PROCEDURE — 2500000003 HC RX 250 WO HCPCS: Performed by: NURSE ANESTHETIST, CERTIFIED REGISTERED

## 2017-12-29 PROCEDURE — 6360000002 HC RX W HCPCS: Performed by: NURSE ANESTHETIST, CERTIFIED REGISTERED

## 2017-12-29 PROCEDURE — 3609012400 HC EGD TRANSORAL BIOPSY SINGLE/MULTIPLE: Performed by: INTERNAL MEDICINE

## 2017-12-29 PROCEDURE — 6360000002 HC RX W HCPCS: Performed by: INTERNAL MEDICINE

## 2017-12-29 PROCEDURE — 6370000000 HC RX 637 (ALT 250 FOR IP): Performed by: INTERNAL MEDICINE

## 2017-12-29 PROCEDURE — 88305 TISSUE EXAM BY PATHOLOGIST: CPT

## 2017-12-29 PROCEDURE — 1200000000 HC SEMI PRIVATE

## 2017-12-29 PROCEDURE — 3609010300 HC COLONOSCOPY W/BIOPSY SINGLE/MULTIPLE: Performed by: INTERNAL MEDICINE

## 2017-12-29 PROCEDURE — 0DBA8ZX EXCISION OF JEJUNUM, VIA NATURAL OR ARTIFICIAL OPENING ENDOSCOPIC, DIAGNOSTIC: ICD-10-PCS | Performed by: INTERNAL MEDICINE

## 2017-12-29 PROCEDURE — 0DB68ZX EXCISION OF STOMACH, VIA NATURAL OR ARTIFICIAL OPENING ENDOSCOPIC, DIAGNOSTIC: ICD-10-PCS | Performed by: INTERNAL MEDICINE

## 2017-12-29 PROCEDURE — 0DB98ZX EXCISION OF DUODENUM, VIA NATURAL OR ARTIFICIAL OPENING ENDOSCOPIC, DIAGNOSTIC: ICD-10-PCS | Performed by: INTERNAL MEDICINE

## 2017-12-29 PROCEDURE — 3700000000 HC ANESTHESIA ATTENDED CARE: Performed by: INTERNAL MEDICINE

## 2017-12-29 PROCEDURE — 0DBM8ZX EXCISION OF DESCENDING COLON, VIA NATURAL OR ARTIFICIAL OPENING ENDOSCOPIC, DIAGNOSTIC: ICD-10-PCS | Performed by: INTERNAL MEDICINE

## 2017-12-29 PROCEDURE — 7100000000 HC PACU RECOVERY - FIRST 15 MIN: Performed by: INTERNAL MEDICINE

## 2017-12-29 PROCEDURE — 0DBP8ZX EXCISION OF RECTUM, VIA NATURAL OR ARTIFICIAL OPENING ENDOSCOPIC, DIAGNOSTIC: ICD-10-PCS | Performed by: INTERNAL MEDICINE

## 2017-12-29 PROCEDURE — 0DBL8ZX EXCISION OF TRANSVERSE COLON, VIA NATURAL OR ARTIFICIAL OPENING ENDOSCOPIC, DIAGNOSTIC: ICD-10-PCS | Performed by: INTERNAL MEDICINE

## 2017-12-29 PROCEDURE — 2580000003 HC RX 258: Performed by: INTERNAL MEDICINE

## 2017-12-29 PROCEDURE — 0DBK8ZX EXCISION OF ASCENDING COLON, VIA NATURAL OR ARTIFICIAL OPENING ENDOSCOPIC, DIAGNOSTIC: ICD-10-PCS | Performed by: INTERNAL MEDICINE

## 2017-12-29 PROCEDURE — A6198 ALGINATE DRESSING > 48 SQ IN: HCPCS

## 2017-12-29 RX ORDER — LIDOCAINE HYDROCHLORIDE 20 MG/ML
INJECTION, SOLUTION INFILTRATION; PERINEURAL PRN
Status: DISCONTINUED | OUTPATIENT
Start: 2017-12-29 | End: 2017-12-29 | Stop reason: SDUPTHER

## 2017-12-29 RX ORDER — PANTOPRAZOLE SODIUM 40 MG/1
40 TABLET, DELAYED RELEASE ORAL
Status: DISCONTINUED | OUTPATIENT
Start: 2017-12-29 | End: 2017-12-31 | Stop reason: HOSPADM

## 2017-12-29 RX ADMIN — PANTOPRAZOLE SODIUM 40 MG: 40 TABLET, DELAYED RELEASE ORAL at 17:45

## 2017-12-29 RX ADMIN — PROPOFOL 20 MG: 10 INJECTION, EMULSION INTRAVENOUS at 13:38

## 2017-12-29 RX ADMIN — SODIUM CHLORIDE: 9 INJECTION, SOLUTION INTRAVENOUS at 16:33

## 2017-12-29 RX ADMIN — PROPOFOL 40 MG: 10 INJECTION, EMULSION INTRAVENOUS at 13:24

## 2017-12-29 RX ADMIN — PROPOFOL 20 MG: 10 INJECTION, EMULSION INTRAVENOUS at 13:12

## 2017-12-29 RX ADMIN — PROPOFOL 30 MG: 10 INJECTION, EMULSION INTRAVENOUS at 13:36

## 2017-12-29 RX ADMIN — PROPOFOL 30 MG: 10 INJECTION, EMULSION INTRAVENOUS at 13:31

## 2017-12-29 RX ADMIN — PROPOFOL 20 MG: 10 INJECTION, EMULSION INTRAVENOUS at 13:27

## 2017-12-29 RX ADMIN — SODIUM CHLORIDE: 9 INJECTION, SOLUTION INTRAVENOUS at 07:46

## 2017-12-29 RX ADMIN — PROPOFOL 30 MG: 10 INJECTION, EMULSION INTRAVENOUS at 13:18

## 2017-12-29 RX ADMIN — PAROXETINE HYDROCHLORIDE 10 MG: 20 TABLET, FILM COATED ORAL at 07:46

## 2017-12-29 RX ADMIN — PROPOFOL 30 MG: 10 INJECTION, EMULSION INTRAVENOUS at 13:33

## 2017-12-29 RX ADMIN — PROPOFOL 30 MG: 10 INJECTION, EMULSION INTRAVENOUS at 13:15

## 2017-12-29 RX ADMIN — LIDOCAINE HYDROCHLORIDE 100 MG: 20 INJECTION, SOLUTION INFILTRATION; PERINEURAL at 13:10

## 2017-12-29 RX ADMIN — ONDANSETRON 4 MG: 2 INJECTION INTRAMUSCULAR; INTRAVENOUS at 16:27

## 2017-12-29 RX ADMIN — PROPOFOL 20 MG: 10 INJECTION, EMULSION INTRAVENOUS at 13:41

## 2017-12-29 RX ADMIN — PROPOFOL 80 MG: 10 INJECTION, EMULSION INTRAVENOUS at 13:10

## 2017-12-29 RX ADMIN — PROPOFOL 30 MG: 10 INJECTION, EMULSION INTRAVENOUS at 13:21

## 2017-12-29 RX ADMIN — WATER 1 G: 1 INJECTION INTRAMUSCULAR; INTRAVENOUS; SUBCUTANEOUS at 21:07

## 2017-12-29 RX ADMIN — PROPOFOL 30 MG: 10 INJECTION, EMULSION INTRAVENOUS at 13:29

## 2017-12-29 RX ADMIN — HYDROCODONE BITARTRATE AND ACETAMINOPHEN 1 TABLET: 5; 325 TABLET ORAL at 16:28

## 2017-12-29 ASSESSMENT — PAIN DESCRIPTION - LOCATION
LOCATION: ABDOMEN
LOCATION: ABDOMEN
LOCATION: ANKLE
LOCATION: ABDOMEN

## 2017-12-29 ASSESSMENT — PAIN SCALES - GENERAL
PAINLEVEL_OUTOF10: 4
PAINLEVEL_OUTOF10: 2
PAINLEVEL_OUTOF10: 2
PAINLEVEL_OUTOF10: 3
PAINLEVEL_OUTOF10: 4
PAINLEVEL_OUTOF10: 0
PAINLEVEL_OUTOF10: 3

## 2017-12-29 ASSESSMENT — PAIN DESCRIPTION - ORIENTATION: ORIENTATION: LOWER

## 2017-12-29 ASSESSMENT — PAIN DESCRIPTION - DESCRIPTORS
DESCRIPTORS: CRAMPING

## 2017-12-29 ASSESSMENT — PAIN DESCRIPTION - PAIN TYPE
TYPE: ACUTE PAIN
TYPE: CHRONIC PAIN

## 2017-12-29 ASSESSMENT — PAIN - FUNCTIONAL ASSESSMENT: PAIN_FUNCTIONAL_ASSESSMENT: 0-10

## 2017-12-29 NOTE — OP NOTE
[facility]    Patient: Wisam Foster  : 1975  Acct#: [de-identified]  Date of evaluation: 2017  Primary Care Physician: Magdiel Tinoco CNP     Procedure:    []EGD    [x]Enteroscopy    [x]Biopsy   []Dilation      []PEG    []PEG change    []PEG removal  []Variceal banding    []Control of bleeding  []Destruction of lesion by [facility]    []Stent Placement  []Foreign Body Removal    []Snare Polypectomy  []Other:     []Aborted:        [x]Colonoscopy  []Flex Sigmoid []EUS, rectal     [x]Biopsy   []Snare Polypectomy    []Control of bleeding  []Destruction of lesion by [facility]    []Stent Placement  []Foreign Body Removal    []Dilation    []Other:    []Aborted:   []Tattoo    Indication:   Diarrhea, anemia, abdominal pain. History:  The patient is a 40 y.o.  female with Body mass index is 24.69 kg/m². admitted 17 for Diarrhea and abdominal pain. She started having right lower quadrant abdominal pain in early Dec 2017. She was seen in ER 17 with abdominal pain and was treated for UTI with levaquin x7 days. CT abdomen and pelvis reported negative in ER report. GI pathogen panel is negative. CRP is 56.39.  CT scan abdomen and pelvis 17 with ileus vs less likely small bowel obstruction. Characteristics of enteritis also. She reports abdominal pain bilateral lower quadrants currently that is relieved with morphine. She vomited yesterday and none since. She states diarrhea started on 17. She takes ibuprofen. Physical Exam:  VITALS: /82   Pulse 53   Temp 98.7 °F (37.1 °C) (Oral)   Resp 16   Ht 5' 3\" (1.6 m)   Wt 139 lb 6.4 oz (63.2 kg)   SpO2 96%   BMI 24.69 kg/m²   The patient is a 40 y.o.  female in no acute distress. HEAD: Normal cephalic/atraumatic. Extra-occular motions intact bilaterally. NECK: No lymphadenopathy or bruits. CHEST: Rises equally on inspiration.  Clear to auscultation Olympus pediatric colonoscope was lubricated and inserted through the anus into the rectum. Under direct visualization, this was advanced to the terminal ileum. Cecal base was identified by the ileocecal valve and appendiceal orifice. The scope was then slowly withdrawn with adequate views of mucosal surfaces. The scope was retroflexed in the rectum. Findings and maneuvers are listed in impression below. The patient tolerated the procedure well. The scope was removed. The patient was moved to the recovery area. There were no immediate complications. IMPRESSION, ENTEROSCOPY:  1. Esophagus - normal   2. Stomach - Antral erythema, biopsied  3. Duodenum - normal, biopsied  4. Jejunum - normal, biopsied    IMPRESSION, COLONOSCOPY:  1. Normal TI, biopsied  2. Normal colon and TI with biopsies taken from TI, Ascending colon, Descending colon, and rectum given the patient's abd pain and diarrhea  3. NSAID use  4. Distended jejunum on CT  5. Elevated CRP  6. No evidence of IBD on exam  7. Hematuria with possible uti, good reason for ileus    RECOMMENDATIONS:    1. Await path  2. Full liquids  3. Daily ppi  4.   If fails to improve, SBFT    Breana Luna MD  1:55 PM 12/29/2017

## 2017-12-29 NOTE — ANESTHESIA POSTPROCEDURE EVALUATION
Department of Anesthesiology  Postprocedure Note    Patient: Charlie Shipman  MRN: [id number]  YOB: 1975  Date of evaluation: 12/29/2017  Time:  2:39 PM     Procedure Summary     Date:  12/29/17 Room / Location:  [address] ENDO 11 / [address] [facility]    Anesthesia Start:  8749 Anesthesia Stop:  1349    Procedures:       EGD BIOPSY (Left Esophagus)      COLONOSCOPY WITH BIOPSY (Left Anus) Diagnosis:  (General Abdominal Pain )    Surgeon:  Susu Kemp MD Responsible Provider:  Sergio Ramirez MD    Anesthesia Type:  MAC ASA Status:  1          Anesthesia Type: No value filed. Delonte Phase I: Delonte Score: 10    Delonte Phase II:      Last vitals: Reviewed and per EMR flowsheets.        Anesthesia Post Evaluation    Patient location during evaluation: bedside  Patient participation: complete - patient participated  Level of consciousness: awake and alert and responsive to verbal stimuli  Pain score: 0  Airway patency: patent  Nausea & Vomiting: no vomiting and no nausea  Complications: no  Cardiovascular status: hemodynamically stable  Respiratory status: acceptable and room air  Hydration status: stable

## 2017-12-29 NOTE — ANESTHESIA PRE PROCEDURE
Department of Anesthesiology  Preprocedure Note       Name:  Leanna John   Age:  40 y.o.  :  1975                                          MRN:  [id number]         Date:  2017      Surgeon: Tiago Bird):  Carol Calloway MD    Procedure: Procedure(s):  EGD BIOPSY  COLONOSCOPY    Medications prior to admission:   Prior to Admission medications    Medication Sig Start Date End Date Taking? Authorizing Provider   ondansetron (ZOFRAN ODT) 4 MG disintegrating tablet Take 1 tablet by mouth every 4 hours as needed for Nausea 7/5/15  Yes Benjamin Motley MD   PARoxetine (PAXIL) 10 MG tablet Take 10 mg by mouth daily as needed   Yes Historical Provider, MD   ibuprofen (ADVIL;MOTRIN) 200 MG tablet Take 4 tablets by mouth every 8 hours as needed for Pain (For mild pain level 1-3). 10/21/12  Yes Amanda Yadav MD   acetaminophen 650 MG TABS Take 650 mg by mouth every 4 hours as needed for Pain (For mild pain level 1-3 or for fever > 100.5).  10/21/12   Amanda Yadav MD       Current medications:    Current Facility-Administered Medications   Medication Dose Route Frequency Provider Last Rate Last Dose    0.9 % sodium chloride infusion   Intravenous Continuous Jaziel Amezquita  mL/hr at 17 0746      cefTRIAXone (ROCEPHIN) 1 g in sterile water 10 mL IV syringe  1 g Intravenous Q24H Kyara Bhakta DO   1 g at 17    PARoxetine (PAXIL) tablet 10 mg  10 mg Oral Daily Rashida An DO   10 mg at 17 0746    sodium chloride flush 0.9 % injection 10 mL  10 mL Intravenous 2 times per day Rashida An DO        sodium chloride flush 0.9 % injection 10 mL  10 mL Intravenous PRN Rashida An DO        potassium chloride (KLOR-CON M) extended release tablet 40 mEq  40 mEq Oral PRN Rashida An DO        Or    potassium chloride 20 MEQ/15ML (10%) oral solution 40 mEq  40 mEq Oral PRN Rashida An, DO        Or    potassium chloride 10 mEq/100 mL IVPB (Peripheral Line)  10 mEq Intravenous PRN Kayla Livings, DO        acetaminophen (TYLENOL) tablet 650 mg  650 mg Oral Q4H PRN Kayla Livings, DO        magnesium hydroxide (MILK OF MAGNESIA) 400 MG/5ML suspension 30 mL  30 mL Oral Daily PRN Kayla Livings, DO        ondansetron [facility]) injection 4 mg  4 mg Intravenous Q6H PRN Kayla Livings, DO   4 mg at 12/26/17 1824    enoxaparin (LOVENOX) injection 40 mg  40 mg Subcutaneous Daily Kayla Livings, DO   40 mg at 12/28/17 1446    HYDROcodone-acetaminophen (NORCO) 5-325 MG per tablet 1 tablet  1 tablet Oral Q4H PRN Kayla Livings, DO   1 tablet at 12/27/17 1223       Allergies:  No Known Allergies    Problem List:    Patient Active Problem List   Diagnosis Code    High-risk pregnancy O09.90    Enteritis K52.9    Ileus ([facility] [address] K56.7    BRETT (acute kidney injury) ([facility] [address] N17.9       Past Medical History:        Diagnosis Date    Abnormal Pap smear     Arthritis     Depression     Mental disorder     history of depression       Past Surgical History:        Procedure Laterality Date    KNEE SURGERY  2007       Social History:    Social History   Substance Use Topics    Smoking status: Current Some Day Smoker     Packs/day: 0.50     Years: 9.00    Smokeless tobacco: Never Used    Alcohol use Yes      Comment: patient state \"a couple of beers a week\"                                Ready to quit: Not Answered  Counseling given: Not Answered      Vital Signs (Current):   Vitals:    12/28/17 2230 12/29/17 0505 12/29/17 0744 12/29/17 1153   BP: (!) 143/85 (!) 105/57 129/82 (!) 141/86   Pulse:  55 53 53   Resp:  16 16 18   Temp:  36.7 °C (98.1 °F) 37.1 °C (98.7 °F)    TempSrc:  Oral Oral    SpO2:  96% 96% 97%   Weight:       Height:                                                  BP Readings from Last 3 Encounters:   12/29/17 (!) 141/86   12/07/17 137/74   07/04/15 113/69       NPO Status: Time of last liquid consumption: 2330                        Time of last solid consumption: 1800                        Date of last liquid consumption: 12/28/17                        Date of last solid food consumption: 12/24/17    BMI:   Wt Readings from Last 3 Encounters:   12/26/17 139 lb 6.4 oz (63.2 kg)   12/07/17 140 lb (63.5 kg)   07/04/15 150 lb (68 kg)     Body mass index is 24.69 kg/m². CBC:   Lab Results   Component Value Date    WBC 9.1 12/27/2017    RBC 3.72 12/27/2017    HGB 11.5 12/27/2017    HCT 34.0 12/27/2017    MCV 91.4 12/27/2017    RDW 14.6 12/27/2017     12/27/2017       CMP:   Lab Results   Component Value Date     12/27/2017    K 4.3 12/27/2017     12/27/2017    CO2 22 12/27/2017    BUN 19 12/27/2017    CREATININE 0.7 12/27/2017    LABGLOM >90 12/27/2017    GLUCOSE 147 12/27/2017    PROT 8.3 12/26/2017    CALCIUM 8.7 12/27/2017    BILITOT 0.5 12/26/2017    ALKPHOS 93 12/26/2017    AST 10 12/26/2017    ALT 7 12/26/2017       POC Tests: No results for input(s): POCGLU, POCNA, POCK, POCCL, POCBUN, POCHEMO, POCHCT in the last 72 hours.     Coags: No results found for: PROTIME, INR, APTT    HCG (If Applicable):   Lab Results   Component Value Date    PREGTESTUR NEGATIVE 03/06/2011    PREGSERUM NEGATIVE 07/05/2015        ABGs: No results found for: PHART, PO2ART, UMT4FZZ, QXJ1QBE, BEART, X6KNTFRT     Type & Screen (If Applicable):  Lab Results   Component Value Date    LABRH Rh Positive 10/16/2012       Anesthesia Evaluation  Patient summary reviewed and Nursing notes reviewed  Airway: Mallampati: III  TM distance: >3 FB   Neck ROM: full  Mouth opening: > = 3 FB Dental: normal exam         Pulmonary:Negative Pulmonary ROS and normal exam                               Cardiovascular:Negative CV ROS  Exercise tolerance: good (>4 METS),           Rhythm: regular  Rate: normal                    Neuro/Psych:   (+) psychiatric history: stable with treatment            GI/Hepatic/Renal: Neg GI/Hepatic/Renal ROS            Endo/Other:    (+) : arthritis:.,

## 2017-12-29 NOTE — PROGRESS NOTES
Photos taken, BIOPSIES TAKEN WITH COLD FORCEPS, SPECIMENS LABELED & 3 JARS SENT TO LAB.   EGD COMPLETED, PT TOLERATED WELL

## 2017-12-30 PROCEDURE — 2580000003 HC RX 258: Performed by: INTERNAL MEDICINE

## 2017-12-30 PROCEDURE — 6360000002 HC RX W HCPCS: Performed by: INTERNAL MEDICINE

## 2017-12-30 PROCEDURE — 99231 SBSQ HOSP IP/OBS SF/LOW 25: CPT | Performed by: INTERNAL MEDICINE

## 2017-12-30 PROCEDURE — 1200000000 HC SEMI PRIVATE

## 2017-12-30 PROCEDURE — 6370000000 HC RX 637 (ALT 250 FOR IP): Performed by: INTERNAL MEDICINE

## 2017-12-30 RX ADMIN — ENOXAPARIN SODIUM 40 MG: 40 INJECTION SUBCUTANEOUS at 14:47

## 2017-12-30 RX ADMIN — PAROXETINE HYDROCHLORIDE 10 MG: 20 TABLET, FILM COATED ORAL at 08:05

## 2017-12-30 RX ADMIN — WATER 1 G: 1 INJECTION INTRAMUSCULAR; INTRAVENOUS; SUBCUTANEOUS at 20:27

## 2017-12-30 RX ADMIN — PANTOPRAZOLE SODIUM 40 MG: 40 TABLET, DELAYED RELEASE ORAL at 05:25

## 2017-12-30 RX ADMIN — SODIUM CHLORIDE: 9 INJECTION, SOLUTION INTRAVENOUS at 02:38

## 2017-12-30 RX ADMIN — SODIUM CHLORIDE: 9 INJECTION, SOLUTION INTRAVENOUS at 14:54

## 2017-12-30 ASSESSMENT — PAIN DESCRIPTION - LOCATION
LOCATION: ABDOMEN

## 2017-12-30 ASSESSMENT — PAIN SCALES - GENERAL
PAINLEVEL_OUTOF10: 2

## 2017-12-30 ASSESSMENT — PAIN DESCRIPTION - DESCRIPTORS
DESCRIPTORS: CRAMPING
DESCRIPTORS: CRAMPING
DESCRIPTORS: ACHING

## 2017-12-30 ASSESSMENT — PAIN DESCRIPTION - ORIENTATION
ORIENTATION: LOWER
ORIENTATION: LOWER;RIGHT;LEFT
ORIENTATION: LOWER

## 2017-12-30 ASSESSMENT — PAIN DESCRIPTION - PAIN TYPE
TYPE: ACUTE PAIN

## 2017-12-30 NOTE — PROGRESS NOTES
A&O         Heart  RRR         Lungs CTAB          ABD S/NT/ND/+BS         Ext No LLE     CBC:   Recent Labs      12/27/17   1806   WBC  9.1   HGB  11.5*   PLT  222     BMP:  No results for input(s): NA, K, CL, CO2, BUN, CREATININE, GLUCOSE in the last 72 hours. Calcium:No results for input(s): CALCIUM in the last 72 hours. Ionized Calcium:No results for input(s): IONCA in the last 72 hours. Magnesium:No results for input(s): MG in the last 72 hours. Phosphorus:No results for input(s): PHOS in the last 72 hours. BNP:No results for input(s): BNP in the last 72 hours. Glucose:No results for input(s): POCGLU in the last 72 hours. HgbA1C: No results for input(s): LABA1C in the last 72 hours. INR: No results for input(s): INR in the last 72 hours. Hepatic: No results for input(s): ALKPHOS, ALT, AST, PROT, BILITOT, BILIDIR, LABALBU in the last 72 hours. Amylase and Lipase:No results for input(s): LIPASE, AMYLASE in the last 72 hours. Lactic Acid: No results for input(s): LACTA in the last 72 hours. Troponin: No results for input(s): CKTOTAL, CKMB, TROPONINI in the last 72 hours. Lipids: No results for input(s): CHOL, TRIG, HDL, LDLCALC in the last 72 hours. Invalid input(s): LDL  PT/INR:   No results for input(s): PROTIME, INR in the last 72 hours. PTT:   No results for input(s): APTT, PTT in the last 72 hours. A. Ileus resolved  Neg gi emre        P.  Advance diet  Home soon  No gi fu

## 2017-12-30 NOTE — PROGRESS NOTES
2    Hospitalist Progress Note    Patient:  Doreen Andrews      Unit/Bed:5E-64/064-A    YOB: 1975    MRN: [id number]       Acct: [de-identified]     PCP: Marcy Bhatti CNP    Date of Admission: 12/26/2017    Chief Complaint: stomach pain post biopsy     Hospital Course: enteritis post egd and colonoscopy biopsy pending results. Pain decreased from admit     Subjective: stomach pain tolerating liquid diet now       Medications:  Reviewed    Infusion Medications    sodium chloride 100 mL/hr at 12/30/17 0238     Scheduled Medications    pantoprazole  40 mg Oral QAM AC    cefTRIAXone (ROCEPHIN) IV  1 g Intravenous Q24H    PARoxetine  10 mg Oral Daily    sodium chloride flush  10 mL Intravenous 2 times per day    enoxaparin  40 mg Subcutaneous Daily     PRN Meds: sodium chloride flush, potassium chloride **OR** potassium chloride **OR** potassium chloride, acetaminophen, magnesium hydroxide, ondansetron, HYDROcodone 5 mg - acetaminophen      Intake/Output Summary (Last 24 hours) at 12/30/17 1422  Last data filed at 12/30/17 1346   Gross per 24 hour   Intake          4116.51 ml   Output             3300 ml   Net           816.51 ml       Diet:  DIET GENERAL;    Exam:  BP (!) 148/87   Pulse 52   Temp 98 °F (36.7 °C) (Oral)   Resp 16   Ht 5' 3\" (1.6 m)   Wt 139 lb 6.4 oz (63.2 kg)   SpO2 92%   BMI 24.69 kg/m²     General appearance: No apparent distress, appears stated age and cooperative. HEENT: Pupils equal, round, and reactive to light. Conjunctivae/corneas clear. Neck: Supple, with full range of motion. No jugular venous distention. Trachea midline. Respiratory:  Normal respiratory effort. Clear to auscultation, bilaterally without Rales/Wheezes/Rhonchi. Cardiovascular: Regular rate and rhythm with normal S1/S2 without murmurs, rubs or gallops. Abdomen: Soft, non-tender, non-distended with normal bowel sounds. Musculoskeletal: No clubbing, cyanosis or edema bilaterally.   Full range of motion without deformity. Skin: Skin color, texture, turgor normal.  No rashes or lesions. Neurologic:  Neurovascularly intact without any focal sensory/motor deficits. Cranial nerves: II-XII intact, grossly non-focal.  Psychiatric: Alert and oriented, thought content appropriate, normal insight  Capillary Refill: Brisk,< 3 seconds   Peripheral Pulses: +2 palpable, equal bilaterally       Labs:   Recent Labs      12/27/17   1806   WBC  9.1   HGB  11.5*   HCT  34.0*   PLT  222     No results for input(s): NA, K, CL, CO2, BUN, CREATININE, CALCIUM, PHOS in the last 72 hours. Invalid input(s): MAGNES  No results for input(s): AST, ALT, BILIDIR, BILITOT, ALKPHOS in the last 72 hours. No results for input(s): INR in the last 72 hours. No results for input(s): Evertt Vanda in the last 72 hours. Urinalysis:    Lab Results   Component Value Date    NITRU NEGATIVE 12/28/2017    WBCUA 0-2 12/28/2017    BACTERIA NONE 12/28/2017    RBCUA 10-15 12/28/2017    BLOODU LARGE 12/28/2017    SPECGRAV >=1.030 12/07/2017    GLUCOSEU NEGATIVE 12/28/2017       Radiology:  XR ABDOMEN (KUB) (SINGLE AP VIEW)   Final Result   Resolution of previously seen ileus. No acute abdominal pathology currently            **This report has been created using voice recognition software. It may contain minor errors which are inherent in voice recognition technology. **      Final report electronically signed by Dr. Lenin Cochran on 12/28/2017 9:11 AM      XR ABDOMEN (KUB) (SINGLE AP VIEW)   Final Result   Moderate mid abdominal paralytic ileus. **This report has been created using voice recognition software. It may contain minor errors which are inherent in voice recognition technology. **      Final report electronically signed by Dr. Mora Recinos on 12/27/2017 9:59 AM      CT ABDOMEN PELVIS W IV CONTRAST Additional Contrast? Oral   Final Result   INTERVAL WORSENING IN THE APPEARANCE OF THE ABDOMEN, PREDOMINATELY WITH

## 2017-12-30 NOTE — PLAN OF CARE
Problem: Pain:  Goal: Pain level will decrease  Pain level will decrease    Outcome: Ongoing  Pain goal 2. Rates pain at 2-3 this shift, described as abdominal cramping. Denies need for pain medication. Using heating pad for comfort. Problem: Infection:  Goal: Will remain free from infection  Will remain free from infection   Outcome: Ongoing  Afebrile. Receiving Rocephin. Problem: Safety:  Goal: Free from accidental physical injury  Free from accidental physical injury   Outcome: Ongoing  Patient up ad kane, ambulating in room and halls as tolerated. Ambulates steadily without assistance. Call light in reach. Uses call light appropriately. Remains free from falls. Problem: Discharge Planning:  Goal: Patients continuum of care needs are met  Patients continuum of care needs are met   Outcome: Ongoing  Plans to return home tomorrow. Denies discharge needs at this time. Discharge planning remains in progress. Problem: GI  Goal: No bowel complications  Outcome: Ongoing  Abdomen soft. Bowel sounds active. Passing gas. Multiple small loose, brown/yellow diarrhea bowel movements. Continues to complain of abdominal cramping. No nausea vomiting. Tolerating small amounts of full liquids (broth, water, pudding). Comments: Care plan reviewed with patient. Patient verbalizes understanding of the plan of care and contribute to goal setting.

## 2017-12-31 VITALS
OXYGEN SATURATION: 95 % | HEART RATE: 54 BPM | WEIGHT: 139.4 LBS | DIASTOLIC BLOOD PRESSURE: 78 MMHG | RESPIRATION RATE: 18 BRPM | SYSTOLIC BLOOD PRESSURE: 135 MMHG | BODY MASS INDEX: 24.7 KG/M2 | TEMPERATURE: 98.3 F | HEIGHT: 63 IN

## 2017-12-31 PROCEDURE — 99238 HOSP IP/OBS DSCHRG MGMT 30/<: CPT | Performed by: INTERNAL MEDICINE

## 2017-12-31 PROCEDURE — 6370000000 HC RX 637 (ALT 250 FOR IP): Performed by: INTERNAL MEDICINE

## 2017-12-31 PROCEDURE — 2580000003 HC RX 258: Performed by: INTERNAL MEDICINE

## 2017-12-31 RX ORDER — PAROXETINE 10 MG/1
10 TABLET, FILM COATED ORAL DAILY
Qty: 30 TABLET | Refills: 3 | Status: SHIPPED | OUTPATIENT
Start: 2018-01-01

## 2017-12-31 RX ADMIN — PANTOPRAZOLE SODIUM 40 MG: 40 TABLET, DELAYED RELEASE ORAL at 07:07

## 2017-12-31 RX ADMIN — SODIUM CHLORIDE: 9 INJECTION, SOLUTION INTRAVENOUS at 00:58

## 2017-12-31 RX ADMIN — PAROXETINE HYDROCHLORIDE 10 MG: 20 TABLET, FILM COATED ORAL at 08:23

## 2017-12-31 ASSESSMENT — PAIN SCALES - GENERAL: PAINLEVEL_OUTOF10: 0

## 2017-12-31 NOTE — FLOWSHEET NOTE
Discharge teaching and instructions given. Patient voiced understanding regarding prescriptions, follow up appointments, and care of self at home.  Discharged to home by family car

## 2017-12-31 NOTE — DISCHARGE SUMMARY
and review of medications and discharge plan. Discharge Medications for PCI/MI (performed or attempted):   ASA:   n     Statin:   n  ACE/ARB:  n   P2Y12 Inhibitor:  n   Beta Blocker:   n  Nitro SL:   n   Cardiac Rehab:  n  Dietary Consult:  y           Signed: Thank you Jasmeet Thacker CNP for the opportunity to be involved in this patient's care.     Electronically signed by Rosalie Friedman DO on 12/31/2017 at 2:11 PM

## 2018-01-05 ENCOUNTER — TELEPHONE (OUTPATIENT)
Dept: UROLOGY | Age: 43
End: 2018-01-05

## 2018-01-05 NOTE — TELEPHONE ENCOUNTER
Patient seen by Bing De Oliveira NP on 12/28/2017 at [facility]. She is calling needing a follow up appointment. Can you please schedule?   Thank you

## 2018-01-08 ENCOUNTER — OFFICE VISIT (OUTPATIENT)
Dept: FAMILY MEDICINE CLINIC | Age: 43
End: 2018-01-08
Payer: COMMERCIAL

## 2018-01-08 VITALS
BODY MASS INDEX: 25.76 KG/M2 | DIASTOLIC BLOOD PRESSURE: 64 MMHG | WEIGHT: 140 LBS | SYSTOLIC BLOOD PRESSURE: 138 MMHG | RESPIRATION RATE: 12 BRPM | HEIGHT: 62 IN | TEMPERATURE: 98.5 F | HEART RATE: 88 BPM

## 2018-01-08 DIAGNOSIS — K56.7 ILEUS (HCC): Primary | ICD-10-CM

## 2018-01-08 DIAGNOSIS — K52.9 ENTERITIS: ICD-10-CM

## 2018-01-08 DIAGNOSIS — Z13.220 SCREENING FOR HYPERLIPIDEMIA: ICD-10-CM

## 2018-01-08 DIAGNOSIS — Z23 NEED FOR PNEUMOCOCCAL VACCINATION: ICD-10-CM

## 2018-01-08 DIAGNOSIS — F41.9 ANXIETY: ICD-10-CM

## 2018-01-08 DIAGNOSIS — R31.21 ASYMPTOMATIC MICROSCOPIC HEMATURIA: ICD-10-CM

## 2018-01-08 PROBLEM — N17.9 AKI (ACUTE KIDNEY INJURY) (HCC): Status: RESOLVED | Noted: 2017-12-26 | Resolved: 2018-01-08

## 2018-01-08 PROCEDURE — 81003 URINALYSIS AUTO W/O SCOPE: CPT | Performed by: NURSE PRACTITIONER

## 2018-01-08 PROCEDURE — 99203 OFFICE O/P NEW LOW 30 MIN: CPT | Performed by: NURSE PRACTITIONER

## 2018-01-08 PROCEDURE — 90471 IMMUNIZATION ADMIN: CPT | Performed by: NURSE PRACTITIONER

## 2018-01-08 PROCEDURE — 90732 PPSV23 VACC 2 YRS+ SUBQ/IM: CPT | Performed by: NURSE PRACTITIONER

## 2018-01-08 ASSESSMENT — PATIENT HEALTH QUESTIONNAIRE - PHQ9
SUM OF ALL RESPONSES TO PHQ QUESTIONS 1-9: 1
2. FEELING DOWN, DEPRESSED OR HOPELESS: 1
SUM OF ALL RESPONSES TO PHQ9 QUESTIONS 1 & 2: 1
1. LITTLE INTEREST OR PLEASURE IN DOING THINGS: 0

## 2018-01-08 NOTE — PROGRESS NOTES
After obtaining consent, and per orders of Silas Smyth CNP, injection of Pneumovax 23 given in Left deltoid by Linda Jin. Patient instructed to remain in clinic for 20 minutes afterwards, and to report any adverse reaction to me immediately.

## 2018-01-08 NOTE — PROGRESS NOTES
Chief Complaint   Patient presents with   [facility] Establish Care     Pt is here to establish care with us. She states that she has not had a family doctor in many years. She is also f/u from [facility] for ileus. She states that she is doing much better. She would like to discuss this Dx. History obtained from chart review and the patient. SUBJECTIVE:  Khalif Morris is a 40 y.o. female that presents today for establishing care with new physician, etc. New patient, 1st time visit to [facility] @ [address]. The patient was recently admitted to [facility] for treatment of enteritis and ileus. Admitted from 12/26-12/31. See hospital d/c summary:    Hospital Course:   Khalif Morris is a 40 y.o. female admitted to [address] [place] [address] on 12/26/2017 for diagnoses of abdominal pain nausea and emesis , no trauma history. No fever but chills initially and diffuse abdominal pain. S/P ileus: is feeling much better, no further abdominal pain, diarrhea. Persistent microscopic hematuria: was supposed to f/u with Urology for this, but hasn't been able to make appt. Needs referral. Denies any pain, urgency, frequency. She has visibly seen blood in her urine in the past, but currently denies seeing any blood. Anxiety     HPI:  Has been on Paxil for about 5 years, previously prescribed by her OBGYN. She takes it maybe 2 days out of the week. Inciting events or triggers for anxiety - kids, boyfriend, social situations   Frequency of anxiety - at least 2 days out of the week  Panic attacks? Yes - has had in the past  Symptoms of panic attacks -  chest pain, feelings of losing control, racing thoughts, shortness of breath and sweating  Sleep Disturbances? Yes - has some problems falling asleep  Impaired concentration? Yes - feels like it's poor  Substance abuse? Yes - cigarettes  Suicidal/Homicidal Ideation?  No    Compliant with meds: no  Med side effects: No    Sees therapist?: No  Family History of Mental Other Topics Concern    Not on file     Social History Narrative    No narrative on file       Family History   Problem Relation Age of Onset    Miscarriages / Stillbirths Maternal Grandmother     Early Death Neg Hx          I have reviewed the patient's past medical history, past surgical history, allergies, medications, social and family history and I have made updates where appropriate. Review of Systems  Positive responses are highlighted in bold    Constitutional:  Fever, Chills, Night Sweats, Fatigue, Unexpected changes in weight  Eyes:  Eye discharge, Eye pain, Eye redness, Visual disturbances   HENT:  Ear pain, Tinnitus, Nosebleeds, Trouble swallowing, Hearing loss, Sore throat  Cardiovascular:  Chest Pain, Palpitations, Orthopnea, Paroxysmal Nocturnal Dyspnea  Respiratory:  Cough, Wheezing, Shortness of breath, Chest tightness, Apnea  Gastrointestinal:  Nausea, Vomiting, Diarrhea, Constipation, Heartburn, Blood in stool  Genitourinary:  Difficulty or painful urination, Flank pain, Change in frequency, Urgency  Skin:  Color change, Rash, Itching, Wound  Psychiatric:  Hallucinations, Anxiety, Depression, Suicidal ideation  Hematological:  Enlarged glands, Easy bleeding, Easily bruising  Musculoskeletal:  Joint pain, Back pain, Gait problems, Joint swelling, Myalgias  Neurological:  Dizziness, Headaches, Presyncope, Numbness, Seizures, Tremors  Allergy:  Environmental allergies, Food allergies  Endocrine:  Heat Intolerance, Cold Intolerance, Polydipsia, Polyphagia, Polyuria    CT abdomen 12/26/17  INTERVAL WORSENING IN THE APPEARANCE OF THE ABDOMEN, PREDOMINATELY WITH RESPECT TO THE BOWEL, NOW WITH FLUID ATTENUATION THROUGH THE MAJORITY OF THE BOWEL LUMEN AS WELL AS MINIMAL DEVELOPMENT OF BOWEL DISTENTION INVOLVING THE SMALL BOWEL,    ESPECIALLY THE JEJUNUM AND ILEUM, DIAMETER OF AS MUCH AS 3.5 CM. CHARACTERISTICS OF ILEUS VERSUS LESS LIKELY SMALL BOWEL OBSTRUCTION.  CHARACTERISTICS OF ENTERITIS Musculoskeletal: No joint swelling or gross deformity   Neuro:  Alert, 5/5 strength globally and symmetrically, 2+ patellar reflexes bilaterally  Psych: Affect appropriate. Mood normal. Thought process is normal without evidence of depression or psychosis. Good insight and appropriate interaction. Cognition and memory appear to be intact. Skin: warm and dry, no rash or erythema  Lymph:  No cervical, auricular or supraclavicular lymph nodes palpated        ASSESSMENT & PLAN  1. Ileus ([address]  - resolved    2. Enteritis  - resolved    3. Asymptomatic microscopic hematuria  - needs cystoscopy, referral to Urology  - [facility] - Jamie Meadows MD  - POCT Urinalysis No Micro (Auto)    4. Screening for hyperlipidemia  - Lipid Panel; Future    5. Anxiety  - continue Paxil  - discussed adherence to taking medication to get full effectiveness    6. Need for pneumococcal vaccination  - Pneumococcal polysaccharide vaccine 23-valent >= 3yo subcutaneous/IM (PNEUMOVAX 23)          DISPOSITION    Return if symptoms worsen or fail to improve. Denman Dandy released without restrictions. PATIENT COUNSELING    Counseling was provided today regarding the following topics: Healthy eating habits, Regular exercise, substance abuse and healthy sleep habits. Denman Dandy received counseling on the following healthy behaviors: medication adherence    Patient given educational materials on: See Attached    I have instructed Denman Dandy to complete a self tracking handout on none and instructed them to bring it with them to her next appointment. Barriers to learning and self management: none    Discussed use, benefit, and side effects of prescribed medications. Barriers to medication compliance addressed. All patient questions answered. Pt voiced understanding.        Electronically signed by Ryley Arreola CNP on 1/8/2018 at 8:17 AM

## 2018-01-22 ENCOUNTER — PROCEDURE VISIT (OUTPATIENT)
Dept: UROLOGY | Age: 43
End: 2018-01-22
Payer: COMMERCIAL

## 2018-01-22 VITALS
SYSTOLIC BLOOD PRESSURE: 116 MMHG | BODY MASS INDEX: 24.27 KG/M2 | DIASTOLIC BLOOD PRESSURE: 80 MMHG | HEIGHT: 63 IN | WEIGHT: 137 LBS

## 2018-01-22 DIAGNOSIS — R31.9 HEMATURIA, UNSPECIFIED TYPE: Primary | ICD-10-CM

## 2018-01-22 LAB
BILIRUBIN URINE: NEGATIVE
BLOOD URINE, POC: NORMAL
CHARACTER, URINE: CLEAR
COLOR, URINE: YELLOW
GLUCOSE URINE: NEGATIVE MG/DL
KETONES, URINE: NORMAL
LEUKOCYTE CLUMPS, URINE: NEGATIVE
NITRITE, URINE: NEGATIVE
PH, URINE: 7
PROTEIN, URINE: NEGATIVE MG/DL
SPECIFIC GRAVITY, URINE: 1.02 (ref 1–1.03)
UROBILINOGEN, URINE: 1 EU/DL

## 2018-01-22 PROCEDURE — 99999 PR OFFICE/OUTPT VISIT,PROCEDURE ONLY: CPT | Performed by: UROLOGY

## 2018-01-22 PROCEDURE — 81003 URINALYSIS AUTO W/O SCOPE: CPT | Performed by: UROLOGY

## 2018-01-22 PROCEDURE — 52000 CYSTOURETHROSCOPY: CPT | Performed by: UROLOGY

## 2018-01-22 ASSESSMENT — ENCOUNTER SYMPTOMS
ABDOMINAL PAIN: 1
COLOR CHANGE: 0
CHEST TIGHTNESS: 0
EYE PAIN: 0
SHORTNESS OF BREATH: 0
NAUSEA: 0
BACK PAIN: 1
EYE REDNESS: 0

## 2018-01-22 NOTE — PROGRESS NOTES
urgency. Musculoskeletal: Positive for back pain. Negative for joint swelling. Skin: Negative for color change and rash. Allergic/Immunologic: Negative for environmental allergies and food allergies. Neurological: Negative for dizziness. Hematological: Does not bruise/bleed easily. Ht 5' 3\" (1.6 m)   Wt 137 lb (62.1 kg)   BMI 24.27 kg/m²     Objective:   Physical Exam    Assessment:      1. Hematuria, unspecified type  POCT Urinalysis No Micro (Auto)    [place] CYSTOURETHROSCOPY       Ms. Bertha Horvath presents today in {Blank single:56411::\"follow-up\",\"consultation\"} for Hematuria, unspecified type [R31.9].     ***       Plan:        ***

## 2018-01-22 NOTE — PROGRESS NOTES
Ms. Hemalatha Aldana was seen in follow up for cystoscopy as per plan developed by Divya Montenegro CNP. Cystoscopy was performed without difficulty and it was well tolerated. Past Medical History:   Diagnosis Date    Abnormal Pap smear     Arthritis     Depression     Mental disorder     history of depression       Past Surgical History:   Procedure Laterality Date    COLONOSCOPY Left 12/29/2017    COLONOSCOPY WITH BIOPSY performed by Martha Goodrich MD at [address]  2007    ND EGD TRANSORAL BIOPSY SINGLE/MULTIPLE Left 12/29/2017    EGD BIOPSY performed by Martha Goodrich MD at [facility]       Current Outpatient Prescriptions on File Prior to Visit   Medication Sig Dispense Refill    PARoxetine (PAXIL) 10 MG tablet Take 1 tablet by mouth daily 30 tablet 3     No current facility-administered medications on file prior to visit. No Known Allergies    Family History   Problem Relation Age of Onset    [de-identified] / Stillbirths Maternal Grandmother     Early Death Neg Hx        Social History     Social History    Marital status:      Spouse name: N/A    Number of children: N/A    Years of education: N/A     Occupational History          Social History Main Topics    Smoking status: Current Every Day Smoker     Packs/day: 0.50     Years: 9.00    Smokeless tobacco: Never Used    Alcohol use Yes      Comment: patient state \"a couple of beers a week\"    Drug use: No    Sexual activity: Yes     Partners: Male     Other Topics Concern    Not on file     Social History Narrative    No narrative on file       Review of Systems  No problems with ears, nose or throat. No problems with eyes. No chest pain, shortness of breath, abdominal pain, extremity pain or weakness, and no neurological deficits. No rashes. No swollen glands or lymph nodes.  symptoms per HPI. The remainder of the review of symptoms is negative.     Exam  General: alert and ureteral orifices were seen and were normal.  The scope was removed. The patient tolerated the procedure and there were no complications. A dose of 500 mg ciprofloxacin was given for the procedure. Impression:  normal cystoscopy    We will send her urine for cytology. CT scan was done a month ago and there was nothing found to explain blood in her urine      Plan:  Blade Raymond underwent cystoscopy today. No abnormality was found. Blade Raymond will have today's urine sent for CX bladder. She will then follow up in 6 months with a urine cytology prior. We will call her if the CX bladder study demonstrates anything of concern.

## 2018-04-11 NOTE — ED TRIAGE NOTES
Patient states she has had right sided abdominal pain since yesterday that is getting more intense.
For information on Fall & Injury Prevention, visit [email or web address]

## 2018-09-05 ENCOUNTER — TELEPHONE (OUTPATIENT)
Dept: FAMILY MEDICINE CLINIC | Age: 43
End: 2018-09-05

## 2020-07-27 ENCOUNTER — NURSE TRIAGE (OUTPATIENT)
Dept: OTHER | Facility: CLINIC | Age: 45
End: 2020-07-27

## 2020-07-27 ENCOUNTER — OFFICE VISIT (OUTPATIENT)
Dept: FAMILY MEDICINE CLINIC | Age: 45
End: 2020-07-27
Payer: COMMERCIAL

## 2020-07-27 VITALS
HEIGHT: 62 IN | RESPIRATION RATE: 10 BRPM | WEIGHT: 159.4 LBS | SYSTOLIC BLOOD PRESSURE: 122 MMHG | OXYGEN SATURATION: 99 % | BODY MASS INDEX: 29.33 KG/M2 | DIASTOLIC BLOOD PRESSURE: 86 MMHG | HEART RATE: 87 BPM | TEMPERATURE: 99.1 F

## 2020-07-27 PROCEDURE — 96372 THER/PROPH/DIAG INJ SC/IM: CPT | Performed by: NURSE PRACTITIONER

## 2020-07-27 PROCEDURE — 99213 OFFICE O/P EST LOW 20 MIN: CPT | Performed by: NURSE PRACTITIONER

## 2020-07-27 RX ORDER — HYDROXYZINE HYDROCHLORIDE 25 MG/1
25 TABLET, FILM COATED ORAL EVERY 8 HOURS PRN
Qty: 30 TABLET | Refills: 0 | Status: SHIPPED | OUTPATIENT
Start: 2020-07-27 | End: 2020-08-06

## 2020-07-27 RX ORDER — DOXYCYCLINE HYCLATE 100 MG
100 TABLET ORAL 2 TIMES DAILY
Qty: 20 TABLET | Refills: 0 | Status: SHIPPED | OUTPATIENT
Start: 2020-07-27 | End: 2020-08-06

## 2020-07-27 RX ORDER — METHYLPREDNISOLONE ACETATE 40 MG/ML
80 INJECTION, SUSPENSION INTRA-ARTICULAR; INTRALESIONAL; INTRAMUSCULAR; SOFT TISSUE ONCE
Status: COMPLETED | OUTPATIENT
Start: 2020-07-27 | End: 2020-07-27

## 2020-07-27 RX ADMIN — METHYLPREDNISOLONE ACETATE 80 MG: 40 INJECTION, SUSPENSION INTRA-ARTICULAR; INTRALESIONAL; INTRAMUSCULAR; SOFT TISSUE at 09:52

## 2020-07-27 ASSESSMENT — PATIENT HEALTH QUESTIONNAIRE - PHQ9
2. FEELING DOWN, DEPRESSED OR HOPELESS: 0
SUM OF ALL RESPONSES TO PHQ QUESTIONS 1-9: 0
SUM OF ALL RESPONSES TO PHQ9 QUESTIONS 1 & 2: 0
1. LITTLE INTEREST OR PLEASURE IN DOING THINGS: 0
SUM OF ALL RESPONSES TO PHQ QUESTIONS 1-9: 0

## 2020-07-27 NOTE — PROGRESS NOTES
Dimple Bowen  is a 38 y.o. y/o female that presents for Rash (rash on both arms since 07.23.2020 itches and spreading to upper chest )      Rash    HPI:    Length of time Sx have been present - 4 days  Rash has gotten worse since initially starting  Affected areas - bilateral upper and lowe arms. Inciting events or exposures prior to rash starting? Not that she knows of, but thinks she gets this every year. Pruritic? Yes  Erythematous? Yes  Weeping or drainage? Yes  History of Urticaria? No  Fever? No  Painful? Yes - she stats her bilateral lower arms ache, bilateral lower arms are swollen  t would like to be referred to allergist,    Review of Systems - General ROS: negative for - chills, fever or night sweats  Respiratory ROS: negative for - shortness of breath, stridor or wheezing      OBJECTIVE:  /86   Pulse 87   Temp 99.1 °F (37.3 °C) (Oral)   Resp 10   Ht 5' 2.4\" (1.585 m)   Wt 159 lb 6.4 oz (72.3 kg)   SpO2 99%   BMI 28.78 kg/m²   She appears well; non-toxic and in no apparent distress. Mouth - mucous membranes moist, pharynx normal without lesions  Chest - clear to auscultation, no wheezes, rales or rhonchi, symmetric air entry  Heart - normal rate, regular rhythm, normal S1, S2, no murmurs, rubs, clicks or gallops  Extremities - no pedal edema noted, intact peripheral pulses  Skin - DERMATITIS NOTED: bilateral inner elbows with erythema and pruitis, left lower arms with weeping wounds, with honey colored drainage. Left lower arm with erythema, it is tender to touch and warm to touch. Left lowe arm swollen. Left radial pulse is normal.   Right lower arm with erythema and edema, no weeping or open wounds. Noted to have edema under right eye. Rash also spreading to chest.       ASSESSMENT & PLAN  Chitra Avila was seen today for rash.     Diagnoses and all orders for this visit:    Allergic reaction, initial encounter  -     [address], [place], [facility], [place]    Dermatitis  As below  depomedrol shot  Wound infection  -     mupirocin (BACTROBAN) 2 % ointment; Apply 3 times daily. Keep wound dry, contact office if rash worsens or develop a fever  Off work for 2 days, pt states she does not need a letter. Contact office in 2 days with update on condition. Pt in agreement with plan. Other orders  -     methylPREDNISolone acetate (DEPO-MEDROL) injection 80 mg  -     hydrOXYzine (ATARAX) 25 MG tablet; Take 1 tablet by mouth every 8 hours as needed for Itching  -     doxycycline hyclate (VIBRA-TABS) 100 MG tablet; Take 1 tablet by mouth 2 times daily for 10 days        Return if symptoms worsen or fail to improve. Reina Peralta received counseling on the following healthy behaviors: medication adherence  Reviewed prior labs and health maintenance. Continue current medications, diet and exercise. Discussed use, benefit, and side effects of prescribed medications. Barriers to medication compliance addressed. Patient given educational materials - see patient instructions. All patient questions answered. Patient voiced understanding.

## 2020-07-27 NOTE — TELEPHONE ENCOUNTER
Reason for Disposition   SEVERE local itching persists after 2 days of steroid cream    Answer Assessment - Initial Assessment Questions  1. APPEARANCE of RASH: \"Describe the rash. \"       Looks like poison ivy. Red and some blistery looking  2. LOCATION: \"Where is the rash located? \"   Both arms  3. NUMBER: \"How many spots are there?\"     4. SIZE: \"How big are the spots? \" (Inches, centimeters or compare to size of a coin)     5. ONSET: \"When did the rash start? \"   Started Thursday evening  6. ITCHING: \"Does the rash itch? \" If so, ask: \"How bad is the itch? \"  (Scale 1-10; or mild, moderate, severe)  Very itchy  7. PAIN: \"Does the rash hurt? \" If so, ask: \"How bad is the pain? \"  (Scale 1-10; or mild, moderate, severe)  No pain but is uncomfortable  8. OTHER SYMPTOMS: \"Do you have any other symptoms? \" (e.g., fever)  No fever, no breathing difficulties  9. PREGNANCY: \"Is there any chance you are pregnant? \" \"When was your last menstrual period? \"    Protocols used: RASH OR REDNESS - LOCALIZED-ADULT-[place]  Received call from [facility]. Pt calling with a rash on both arms. Looks like it could be poison ivy. Is very itchy. Recommend pt is seen today, call back if any new or worsening symptoms. Call soft transferred to Dustin Vang in [address] to schedule appointment. Please do not reply to the triage nurse through this encounter. Any subsequent communication should be directly with the patient.

## 2020-07-27 NOTE — PATIENT INSTRUCTIONS
You may receive a survey regarding the care you received during your visit. Your input is valuable to us. We encourage you to complete and return your survey. We hope you will choose us in the future for your healthcare needs. Patient Education        Atopic Dermatitis: Care Instructions  Your Care Instructions  Atopic dermatitis (also called eczema) is a skin problem that causes intense itching and a red, raised rash. In severe cases, the rash develops clear fluid-filled blisters. The rash is not contagious. People with this condition seem to have very sensitive immune systems that are likely to react to things that cause allergies. The immune system is the body's way of fighting infection. There is no cure for atopic dermatitis, but you may be able to control it with care at home. Follow-up care is a key part of your treatment and safety. Be sure to make and go to all appointments, and call your doctor if you are having problems. It's also a good idea to know your test results and keep a list of the medicines you take. How can you care for yourself at home? · Use moisturizer at least twice a day. · If your doctor prescribes a cream, use it as directed. If your doctor prescribes other medicine, take it exactly as directed. · Wash the affected area with water only. Soap can make dryness and itching worse. Pat dry. · Apply a moisturizer after bathing. Use a cream such as Lubriderm, Moisturel, or Cetaphil that does not irritate the skin or cause a rash. Apply the cream while your skin is still damp after lightly drying with a towel. · Use cold, wet cloths to reduce itching. · Keep cool, and stay out of the sun. · If itching affects your normal activities, an over-the-counter antihistamine, such as diphenhydramine (Benadryl) or loratadine (Claritin) may help. Read and follow all instructions on the label. When should you call for help?    Call your doctor now or seek immediate medical care if:  · Your

## 2020-07-28 ENCOUNTER — TELEPHONE (OUTPATIENT)
Dept: FAMILY MEDICINE CLINIC | Age: 45
End: 2020-07-28

## 2020-07-30 ENCOUNTER — OFFICE VISIT (OUTPATIENT)
Dept: FAMILY MEDICINE CLINIC | Age: 45
End: 2020-07-30
Payer: COMMERCIAL

## 2020-07-30 ENCOUNTER — TELEPHONE (OUTPATIENT)
Dept: FAMILY MEDICINE CLINIC | Age: 45
End: 2020-07-30

## 2020-07-30 VITALS
SYSTOLIC BLOOD PRESSURE: 136 MMHG | WEIGHT: 155.8 LBS | DIASTOLIC BLOOD PRESSURE: 92 MMHG | TEMPERATURE: 98.9 F | HEIGHT: 63 IN | RESPIRATION RATE: 10 BRPM | BODY MASS INDEX: 27.61 KG/M2 | OXYGEN SATURATION: 98 % | HEART RATE: 70 BPM

## 2020-07-30 PROCEDURE — 99214 OFFICE O/P EST MOD 30 MIN: CPT | Performed by: NURSE PRACTITIONER

## 2020-07-30 RX ORDER — PREDNISONE 20 MG/1
40 TABLET ORAL DAILY
Qty: 14 TABLET | Refills: 0 | Status: SHIPPED | OUTPATIENT
Start: 2020-07-30 | End: 2020-08-06

## 2020-07-30 RX ORDER — MECLIZINE HYDROCHLORIDE 25 MG/1
TABLET ORAL
Qty: 15 TABLET | Refills: 0 | Status: SHIPPED | OUTPATIENT
Start: 2020-07-30

## 2020-07-30 NOTE — PROGRESS NOTES
Chief Complaint   Patient presents with    Follow-up     ithcing rash bilateral arms an chest     Loss of Consciousness     last episode was Tuesday        History obtained from chart review and the patient. SUBJECTIVE:  Caitlyn Chaudhari is a 38 y.o. female that presents today for follow up dermatitis    Rash    HPI:    Length of time Sx have been present - 1-2 weeks  Rash has gotten unchanged since initially starting  Affected areas - arms  Inciting events or exposures prior to rash starting? No  Pruritic? Yes  Erythematous? Yes  Weeping or drainage? Yes  History of Urticaria? No  Fever? No  Painful? No    Review of Systems - General ROS: negative for - chills, fever or night sweats  Respiratory ROS: negative for - shortness of breath, stridor or wheezing    Dizziness    HPI:      Symptoms have been present for 10+ year(s). Patient describes symptoms as lightheaded, dizzy, feels like she's spinning, turning or movement makes it worse  Symptoms are lasts for about 5 minutes  Inciting event prior to symptoms starting? No  Provoking factos- none. Will happen randomly. Can happen while sitting  Alleviating factors - sitting still helps  Frequency of symptoms - happens a couple times/month. Duration of events - lasts up to 5 minutes.     Associated Symptoms -  vertigo  History of trauma - No   Nausea and vomiting - No  History of Meniers disease, BPPV, or labrinthitis - No  Medications that can cause vertigo -  No  History of alcohol consumption - No    Age/Gender Health Maintenance    Lipid - 40  DM Screen - 40  Colon Cancer Screening - 50  Lung Cancer Screening (Age 54 to [de-identified] with 30 pack year hx, current smoker or quit within past 15 years) - 54    Tetanus - needs  Influenza Vaccine - needs  Pneumonia Vaccine - 65  Zostavax - 50  HPV Vaccine - n/a    Breast Cancer Screening - 50  Cervical Cancer Screening - April of 2017  Osteoporosis Screening - 66  Chlamydia Screen - n/a      Current Outpatient Medications   Medication Sig Dispense Refill    betamethasone valerate (VALISONE) 0.1 % cream Apply topically 2 times daily. 45 g 2    predniSONE (DELTASONE) 20 MG tablet Take 2 tablets by mouth daily for 7 days 14 tablet 0    meclizine (ANTIVERT) 25 MG tablet Take 1/2 tablet to 1 full tablet by mouth three times a day prn for dizziness 15 tablet 0    hydrOXYzine (ATARAX) 25 MG tablet Take 1 tablet by mouth every 8 hours as needed for Itching 30 tablet 0    doxycycline hyclate (VIBRA-TABS) 100 MG tablet Take 1 tablet by mouth 2 times daily for 10 days 20 tablet 0    mupirocin (BACTROBAN) 2 % ointment Apply 3 times daily. 30 g 0    PARoxetine (PAXIL) 10 MG tablet Take 1 tablet by mouth daily 30 tablet 3     No current facility-administered medications for this visit. Orders Placed This Encounter   Medications    betamethasone valerate (VALISONE) 0.1 % cream     Sig: Apply topically 2 times daily. Dispense:  45 g     Refill:  2    predniSONE (DELTASONE) 20 MG tablet     Sig: Take 2 tablets by mouth daily for 7 days     Dispense:  14 tablet     Refill:  0    meclizine (ANTIVERT) 25 MG tablet     Sig: Take 1/2 tablet to 1 full tablet by mouth three times a day prn for dizziness     Dispense:  15 tablet     Refill:  0         All medications reviewed and reconciled, including OTC and herbal medications. Updated list given to patient.        Patient Active Problem List   Diagnosis    High-risk pregnancy    Anxiety    Asymptomatic microscopic hematuria       Past Medical History:   Diagnosis Date    Abnormal Pap smear     Arthritis     Depression     Mental disorder     history of depression       Past Surgical History:   Procedure Laterality Date    COLONOSCOPY Left 12/29/2017    COLONOSCOPY WITH BIOPSY performed by Zilphia Felty, MD at [address] [place] [address]  2007    ME EGD TRANSORAL BIOPSY SINGLE/MULTIPLE Left 12/29/2017    EGD BIOPSY performed by Zilphia Felty, [facility] at [facility]       No Known Allergies    Social History     Socioeconomic History    Marital status:      Spouse name: Not on file    Number of children: Not on file    Years of education: Not on file    Highest education level: Not on file   Occupational History    Occupation:    Social Needs    Financial resource strain: Not on file    Food insecurity     Worry: Not on file     Inability: Not on file    Transportation needs     Medical: Not on file     Non-medical: Not on file   Tobacco Use    Smoking status: Current Every Day Smoker     Packs/day: 0.50     Years: 9.00     Pack years: 4.50    Smokeless tobacco: Never Used   Substance and Sexual Activity    Alcohol use: Yes     Comment: patient state \"a couple of beers a week\"    Drug use: No    Sexual activity: Yes     Partners: Male   Lifestyle    Physical activity     Days per week: Not on file     Minutes per session: Not on file    Stress: Not on file   Relationships    Social connections     Talks on phone: Not on file     Gets together: Not on file     Attends [employer]ism service: Not on file     Active member of club or organization: Not on file     Attends meetings of clubs or organizations: Not on file     Relationship status: Not on file    Intimate partner violence     Fear of current or ex partner: Not on file     Emotionally abused: Not on file     Physically abused: Not on file     Forced sexual activity: Not on file   Other Topics Concern    Not on file   Social History Narrative    Not on file       Family History   Problem Relation Age of Onset    Miscarriages / Stillbirths Maternal Grandmother     Early Death Neg Hx          I have reviewed the patient's past medical history, past surgical history, allergies, medications, social and family history and I have made updates where appropriate.       Review of Systems  Positive responses are highlighted in bold    Constitutional:  Fever, Chills, Night Sweats, Fatigue, Unexpected changes in weight  Eyes:  Eye discharge, Eye pain, Eye redness, Visual disturbances   HENT:  Ear pain, Tinnitus, Nosebleeds, Trouble swallowing, Hearing loss, Sore throat  Cardiovascular:  Chest Pain, Palpitations, Orthopnea, Paroxysmal Nocturnal Dyspnea  Respiratory:  Cough, Wheezing, Shortness of breath, Chest tightness, Apnea  Gastrointestinal:  Nausea, Vomiting, Diarrhea, Constipation, Heartburn, Blood in stool  Genitourinary:  Difficulty or painful urination, Flank pain, Change in frequency, Urgency  Skin:  Color change, Rash, Itching, Wound  Psychiatric:  Hallucinations, Anxiety, Depression, Suicidal ideation  Hematological:  Enlarged glands, Easy bleeding, Easily bruising  Musculoskeletal:  Joint pain, Back pain, Gait problems, Joint swelling, Myalgias  Neurological:  Dizziness, Headaches, Presyncope, Numbness, Seizures, Tremors  Allergy:  Environmental allergies, Food allergies  Endocrine:  Heat Intolerance, Cold Intolerance, Polydipsia, Polyphagia, Polyuria    Lab Results   Component Value Date     12/27/2017    K 4.3 12/27/2017     12/27/2017    CO2 22 (L) 12/27/2017    BUN 19 12/27/2017    CREATININE 0.7 12/27/2017    GLUCOSE 147 (H) 12/27/2017    CALCIUM 8.7 12/27/2017    PROT 8.3 (H) 12/26/2017    LABALBU 4.3 12/26/2017    BILITOT 0.5 12/26/2017    ALKPHOS 93 12/26/2017    AST 10 12/26/2017    ALT 7 (L) 12/26/2017    LABGLOM >90 12/27/2017     Lab Results   Component Value Date    WBC 9.1 12/27/2017    HGB 11.5 (L) 12/27/2017    HCT 34.0 (L) 12/27/2017    MCV 91.4 12/27/2017     12/27/2017       PHYSICAL EXAM:  Vitals:    07/30/20 1149   BP: (!) 136/92   Pulse: 70   Resp: 10   Temp: 98.9 °F (37.2 °C)   TempSrc: Oral   SpO2: 98%   Weight: 155 lb 12.8 oz (70.7 kg)   Height: 5' 2.6\" (1.59 m)     Body mass index is 27.95 kg/m².          VS Reviewed  General Appearance: A&O x 3, No acute distress,well developed and well- nourished  Head: normocephalic and atraumatic  Eyes: pupils equal, round, and reactive to light, extraocular eye movements intact, conjunctivae and eye lids without erythema  Neck: supple and non-tender without mass, no thyromegaly or thyroid nodules, no cervical lymphadenopathy  Pulmonary/Chest: clear to auscultation bilaterally- no wheezes, rales or rhonchi, normal air movement, no respiratory distress or retractions  Cardiovascular: S1 and S2 auscultated w/ RRR. No murmurs, rubs, clicks, or gallops, distal pulses intact. Abdomen: soft, non-tender, non-distended, bowl sounds physiologic,  no rebound or guarding, no masses or hernias noted. Liver and spleen without enlargement. Extremities: no cyanosis, clubbing or edema of the lower extremities  Musculoskeletal: No joint swelling or gross deformity   Neuro:  Alert, 5/5 strength globally and symmetrically, + Vanderbilt Hallpike to the right  Psych: Affect appropriate. Mood normal. Thought process is normal without evidence of depression or psychosis. Good insight and appropriate interaction. Cognition and memory appear to be intact. Skin: warm and dry, erythematous dermatitis bilateral forearms with vesicular lesions, mildly warm to touch  Lymph:  No cervical, auricular or supraclavicular lymph nodes palpated    ASSESSMENT & PLAN  Myrna Hannon was seen today for follow-up and loss of consciousness. Diagnoses and all orders for this visit:    Dermatitis  -     betamethasone valerate (VALISONE) 0.1 % cream; Apply topically 2 times daily. -     predniSONE (DELTASONE) 20 MG tablet; Take 2 tablets by mouth daily for 7 days    Benign paroxysmal positional vertigo, unspecified laterality  -     meclizine (ANTIVERT) 25 MG tablet;  Take 1/2 tablet to 1 full tablet by mouth three times a day prn for dizziness      - con't course of Doxy  - add topical steroid cream and prednisone burst  - dizziness consistent with BPPV  - start Antivert prn   - cawthorne exercises    DISPOSITION    Return if symptoms worsen or fail to improve. Padilla Teran released without restrictions. PATIENT COUNSELING    Counseling was provided today regarding the following topics: Healthy eating habits, Regular exercise, substance abuse and healthy sleep habits. Padilla Teran received counseling on the following healthy behaviors: medication adherence    Patient given educational materials on: See Attached    I have instructed Padilla Teran to complete a self tracking handout on none and instructed them to bring it with them to her next appointment. Barriers to learning and self management: none    Discussed use, benefit, and side effects of prescribed medications. Barriers to medication compliance addressed. All patient questions answered. Pt voiced understanding.        Electronically signed by TAURUS Cat CNP on 7/30/2020 at 12:57 PM

## 2020-09-03 ENCOUNTER — OFFICE VISIT (OUTPATIENT)
Dept: ALLERGY | Age: 45
End: 2020-09-03
Payer: COMMERCIAL

## 2020-09-03 ENCOUNTER — NURSE ONLY (OUTPATIENT)
Dept: LAB | Age: 45
End: 2020-09-03

## 2020-09-03 VITALS
DIASTOLIC BLOOD PRESSURE: 80 MMHG | TEMPERATURE: 98.3 F | BODY MASS INDEX: 26.38 KG/M2 | RESPIRATION RATE: 14 BRPM | SYSTOLIC BLOOD PRESSURE: 124 MMHG | HEART RATE: 66 BPM | WEIGHT: 147 LBS

## 2020-09-03 LAB
ALBUMIN SERPL-MCNC: 4.3 G/DL (ref 3.5–5.1)
ALP BLD-CCNC: 67 U/L (ref 38–126)
ALT SERPL-CCNC: 12 U/L (ref 11–66)
ANION GAP SERPL CALCULATED.3IONS-SCNC: 12 MEQ/L (ref 8–16)
AST SERPL-CCNC: 17 U/L (ref 5–40)
BACTERIA: ABNORMAL /HPF
BASOPHILS # BLD: 0.4 %
BASOPHILS ABSOLUTE: 0 THOU/MM3 (ref 0–0.1)
BILIRUB SERPL-MCNC: 0.5 MG/DL (ref 0.3–1.2)
BILIRUBIN DIRECT: < 0.2 MG/DL (ref 0–0.3)
BILIRUBIN URINE: NEGATIVE
BLOOD, URINE: ABNORMAL
BUN BLDV-MCNC: 12 MG/DL (ref 7–22)
C3 COMPLEMENT: 85 MG/DL (ref 90–180)
CALCIUM SERPL-MCNC: 9.3 MG/DL (ref 8.5–10.5)
CASTS 2: ABNORMAL /LPF
CASTS UA: ABNORMAL /LPF
CHARACTER, URINE: CLEAR
CHLORIDE BLD-SCNC: 107 MEQ/L (ref 98–111)
CO2: 22 MEQ/L (ref 23–33)
COLOR: YELLOW
COMPLEMENT C4: 15 MG/DL (ref 10–40)
CREAT SERPL-MCNC: 0.6 MG/DL (ref 0.4–1.2)
CRYSTALS, UA: ABNORMAL
EOSINOPHIL # BLD: 2.4 %
EOSINOPHIL SMEAR: NORMAL
EOSINOPHILS ABSOLUTE: 0.2 THOU/MM3 (ref 0–0.4)
EPITHELIAL CELLS, UA: ABNORMAL /HPF
ERYTHROCYTE [DISTWIDTH] IN BLOOD BY AUTOMATED COUNT: 12.9 % (ref 11.5–14.5)
ERYTHROCYTE [DISTWIDTH] IN BLOOD BY AUTOMATED COUNT: 46.5 FL (ref 35–45)
GFR SERPL CREATININE-BSD FRML MDRD: > 90 ML/MIN/1.73M2
GLUCOSE BLD-MCNC: 93 MG/DL (ref 70–108)
GLUCOSE URINE: NEGATIVE MG/DL
HCT VFR BLD CALC: 42.1 % (ref 37–47)
HEMOGLOBIN: 14 GM/DL (ref 12–16)
IGA: 155 MG/DL (ref 70–400)
IGE: 49 IU/ML
IGG: 988 MG/DL (ref 700–1600)
IGM: 233 MG/DL (ref 40–230)
IMMATURE GRANS (ABS): 0.01 THOU/MM3 (ref 0–0.07)
IMMATURE GRANULOCYTES: 0.1 %
KETONES, URINE: ABNORMAL
LEUKOCYTE ESTERASE, URINE: NEGATIVE
LYMPHOCYTES # BLD: 27.6 %
LYMPHOCYTES ABSOLUTE: 1.9 THOU/MM3 (ref 1–4.8)
MCH RBC QN AUTO: 32.4 PG (ref 26–33)
MCHC RBC AUTO-ENTMCNC: 33.3 GM/DL (ref 32.2–35.5)
MCV RBC AUTO: 97.5 FL (ref 81–99)
MISCELLANEOUS 2: ABNORMAL
MONOCYTES # BLD: 4.6 %
MONOCYTES ABSOLUTE: 0.3 THOU/MM3 (ref 0.4–1.3)
NITRITE, URINE: NEGATIVE
NUCLEATED RED BLOOD CELLS: 0 /100 WBC
PH UA: 5.5 (ref 5–9)
PLATELET # BLD: 212 THOU/MM3 (ref 130–400)
PMV BLD AUTO: 11.3 FL (ref 9.4–12.4)
POTASSIUM SERPL-SCNC: 3.8 MEQ/L (ref 3.5–5.2)
PROTEIN UA: NEGATIVE
RBC # BLD: 4.32 MILL/MM3 (ref 4.2–5.4)
RBC URINE: ABNORMAL /HPF
RENAL EPITHELIAL, UA: ABNORMAL
SEDIMENTATION RATE, ERYTHROCYTE: 2 MM/HR (ref 0–20)
SEG NEUTROPHILS: 64.9 %
SEGMENTED NEUTROPHILS ABSOLUTE COUNT: 4.5 THOU/MM3 (ref 1.8–7.7)
SODIUM BLD-SCNC: 141 MEQ/L (ref 135–145)
SPECIFIC GRAVITY, URINE: 1.01 (ref 1–1.03)
SPECIMEN: NORMAL
TOTAL PROTEIN: 7.2 G/DL (ref 6.1–8)
UROBILINOGEN, URINE: 1 EU/DL (ref 0–1)
VITAMIN D 25-HYDROXY: 33 NG/ML (ref 30–100)
WBC # BLD: 7 THOU/MM3 (ref 4.8–10.8)
WBC UA: ABNORMAL /HPF
YEAST: ABNORMAL

## 2020-09-03 PROCEDURE — 99213 OFFICE O/P EST LOW 20 MIN: CPT | Performed by: NURSE PRACTITIONER

## 2020-09-03 ASSESSMENT — ENCOUNTER SYMPTOMS
RHINORRHEA: 1
EYE ITCHING: 1
SINUS PRESSURE: 1
SORE THROAT: 1

## 2020-09-03 NOTE — PROGRESS NOTES
Allergy & Asthma   [address]  [place], [address]  [address] [facility]  Fax: [phone]          Chief Complaint:   Chief Complaint   Patient presents with    New Patient     Patient is here for allergic reaction. Arms and chest were covered in hives       HISTORY OF PRESENT ILLNESS: NEW PATIENT TO PRACTICE   80-year-old female here today for eczema and rash on her arms, trunk and legs. Patient has brought pictures in which shows a severe atopic dermatitis on her arms extending down into her hands. She reports that she does not know why the rash occurs but it has occurred spontaneously for the last 4 years. She thinks it may be something in her home which is causing it such as mold but she is uncertain. She denies any nausea vomiting fever. Patient states that she has to be put on steroid burst Dosepaks, antibiotics when it does occur. She is taking these at least once a year. She thinks she is also allergic to pollen. She does have itchy watery eyes that swell and are dry. Her throat itches and is sore. She has a cough at times. She complains of headaches. The rash also is on her arms and neck and legs at times. Patient has a past medical history of arthritis. She has had knee surgery for arthritis without any complications. Family history includes blood clots. Patient does consume alcohol 2-4 times a month. She has cats in her home as well as carpet. She is a  and is exposed to toxic chemicals at work but does not feel like this is contributing to her problem. She is lived in her current home approximately for 3 years. She states that she has not traveled outside [place] in the last 6 months. She has had chronic problems with skin rashes. Review of Systems:  Review of Systems   HENT: Positive for congestion, postnasal drip, rhinorrhea, sinus pressure, sneezing and sore throat. Eyes: Positive for itching. Musculoskeletal: Positive for arthralgias. Skin: Positive for rash. Allergic/Immunologic: Positive for environmental allergies. All other systems reviewed and are negative. Past Medical History:    Past Medical History:   Diagnosis Date    Abnormal Pap smear     Arthritis     Depression     Mental disorder     history of depression       Past Surgical History:    Past Surgical History:   Procedure Laterality Date    COLONOSCOPY Left 12/29/2017    COLONOSCOPY WITH BIOPSY performed by Hakeem Grant MD at [address]  2007    VT EGD TRANSORAL BIOPSY SINGLE/MULTIPLE Left 12/29/2017    EGD BIOPSY performed [facility] H[facility]em [facility]       Family History:   Family History   Problem Relation Age of Onset    [de-identified] / Stillbirths Maternal Grandmother     Early Death Neg Hx        Social History:   Social History     Tobacco Use    Smoking status: Current Every Day Smoker     Packs/day: 0.50     Years: 9.00     Pack years: 4.50    Smokeless tobacco: Never Used   Substance Use Topics    Alcohol use: Yes     Comment: patient state \"a couple of beers a week\"        Allergies:    No Known Allergies    Current Medications:   Prior to Visit Medications    Medication Sig Taking? Authorizing Provider   meclizine (ANTIVERT) 25 MG tablet Take 1/2 tablet to 1 full tablet by mouth three times a day prn for dizziness Yes Laure Newell, APRN - CNP   PARoxetine (PAXIL) 10 MG tablet Take 1 tablet by mouth daily Yes Shaheed Diop DO       Vitals:  Vitals:    09/03/20 0932   BP: 124/80   Pulse: 66   Resp: 14   Temp: 98.3 °F (36.8 °C)       147 lb (66.7 kg)           Physical Exam:  Physical Exam  Vitals signs and nursing note reviewed. Constitutional:       Appearance: Normal appearance. She is normal weight. HENT:      Head: Normocephalic and atraumatic.       Right Ear: Tympanic membrane, ear canal and external ear normal.      Left Ear: Tympanic membrane, ear canal and external ear normal.      Nose: Nose normal.      Mouth/Throat:      Mouth: Mucous membranes are moist.      Pharynx: Oropharynx is clear. Eyes:      Extraocular Movements: Extraocular movements intact. Conjunctiva/sclera: Conjunctivae normal.      Pupils: Pupils are equal, round, and reactive to light. Neck:      Musculoskeletal: Normal range of motion and neck supple. Cardiovascular:      Rate and Rhythm: Normal rate and regular rhythm. Heart sounds: Normal heart sounds. Pulmonary:      Effort: Pulmonary effort is normal.      Breath sounds: Normal breath sounds. Musculoskeletal: Normal range of motion. Skin:     General: Skin is warm and dry. Capillary Refill: Capillary refill takes less than 2 seconds. Comments: Negative dermatographia   Neurological:      General: No focal deficit present. Mental Status: She is alert and oriented to person, place, and time. Mental status is at baseline. Psychiatric:         Mood and Affect: Mood normal.         Behavior: Behavior normal.         Thought Content: Thought content normal.         Judgment: Judgment normal.           DATA:  Lab Review:   Results for orders placed or performed in visit on 01/22/18   POCT Urinalysis No Micro (Auto)   Result Value Ref Range    Glucose, Ur Negative NEGATIVE mg/dl    Bilirubin Urine Negative     Ketones, Urine Trace NEGATIVE    Specific Gravity, Urine 1.020 1.002 - 1.03    Blood, UA POC Moderate NEGATIVE    pH, Urine 7.00 5.0 - 9.0    Protein, Urine Negative NEGATIVE mg/dl    Urobilinogen, Urine 1.00 0.0 - 1.0 eu/dl    Nitrite, Urine Negative NEGATIVE    Leukocyte Clumps, Urine Negative NEGATIVE    Color, Urine Yellow YELLOW-STR    Character, Urine Clear [id number]         Data from outside facility:yes, reviewed primary care note from July 28 and July 30. Patient was treated with steroids and antibiotic for severe rash in her lower arms. Patient states that she responded well to the steroids and the rash eventually resolved.   The rash is unknown etiology and insidious  PROCEDURES:      Skin Testing performed on: Pending    Assessment/Plan   Phyllis Webber was seen today for new patient. Diagnoses and all orders for this visit:    Urticaria  -     CBC Auto Differential; Future  -     IgA; Future  -     IgG; Future  -     IgM; Future  -     IgE; Future  -     GEETA Screen with Reflex; Future  -     Miscellaneous Sendout 1; Future  -     C3 Complement; Future  -     C4 Complement; Future  -     Complement, Total; Future  -     Complement, Total; Future  -     Comprehensive Metabolic Panel; Future  -     Cancel: Eosinophil smear urine; Future  -     Hepatic Function Panel; Future  -     Histamine; Future  -     Histone Antibodies IgG; Future  -     Sedimentation Rate; Future  -     STRONGYLOIDES AB, IGG BY NUPUR; Future  -     Urinalysis Reflex to Culture; Future  -     Urinalysis with Microscopic; Future  -     Vitamin D 25 Hydroxy; Future  -     Aspergillus antibodies; Future  -     Aspergillus Glacto AG Assay  -     Eosinophil smear urine; Future    Hives  -     CBC Auto Differential; Future  -     IgA; Future  -     IgG; Future  -     IgM; Future  -     IgE; Future  -     GEETA Screen with Reflex; Future  -     Miscellaneous Sendout 1; Future  -     C3 Complement; Future  -     C4 Complement; Future  -     Complement, Total; Future  -     Complement, Total; Future  -     Comprehensive Metabolic Panel; Future  -     Cancel: Eosinophil smear urine; Future  -     Hepatic Function Panel; Future  -     Histamine; Future  -     Histone Antibodies IgG; Future  -     Sedimentation Rate; Future  -     STRONGYLOIDES AB, IGG BY NUPUR; Future  -     Urinalysis Reflex to Culture; Future  -     Urinalysis with Microscopic; Future  -     Vitamin D 25 Hydroxy; Future  -     Aspergillus antibodies; Future  -     Aspergillus Glacto AG Assay  -     Eosinophil smear urine;  Future    Non-seasonal allergic rhinitis due to pollen        Return in about 4 weeks (around 10/1/2020) for Allergy Testing, Lab review. Patient will be evaluated for urticaria and atopic dermatitis. Labs will be drawn today. Patient will be followed up in 4 weeks for allergy skin testing and lab review    Total time spent with patient greater than 45 minutes with at least 50% being in education.   (Please note that portions of this note may have been completed with a voice recognition program.  Efforts were made to edit the dictation but occasionally words are mis-transcribed.)        Signed:   TAURUS Martino CNP  9/3/2020  10:12 AM

## 2020-09-03 NOTE — PATIENT INSTRUCTIONS
Patient is going to proceed with allergy testing. Explained the risk and benefits. Risk or potential infection as well as anaphylaxis. Patient still wishes to proceed. Explained testing procedure and time allotment. Patient to hold all antihistamines, nasal steroid sprays, and leukotriene inhibitors until next appointment in approximately 1 week. Patient may use a Sinus rinses as needed. For nico pot type sinus rinses, patient to only use distilled water or boiled water after cold off of the stovetop. Patient may use as much as necessary. I did explain to the patient's that symptoms may become worse during the next week or so until allergy testing is completed and can restart on antihistamines.   Patient expressed understanding

## 2020-09-05 LAB
ANA SCREEN: NORMAL
HISTONE ANTIBODY IGG: NORMAL
MYELOPEROXIDASE AB, IGG: 0 AU/ML (ref 0–19)
SERINE PROTEASE 3, IGG: 0 AU/ML (ref 0–19)
STRONGYLOIDES ANTIBODY: NORMAL

## 2020-09-06 LAB
ASPERGILLUS ANTIBODY CF: NORMAL
HISTAMINE, WHOLE BLOOD: NORMAL

## 2020-09-09 LAB — COMPLEMENT TOTAL (CH50): 28 U/ML (ref 38.7–89.9)

## 2020-09-14 LAB — ASPERGILLUS GALACTO AG: NORMAL

## 2020-10-21 ENCOUNTER — NURSE ONLY (OUTPATIENT)
Dept: LAB | Age: 45
End: 2020-10-21

## 2020-10-21 ENCOUNTER — PROCEDURE VISIT (OUTPATIENT)
Dept: ALLERGY | Age: 45
End: 2020-10-21
Payer: COMMERCIAL

## 2020-10-21 VITALS
DIASTOLIC BLOOD PRESSURE: 62 MMHG | TEMPERATURE: 98.1 F | BODY MASS INDEX: 26.38 KG/M2 | HEART RATE: 70 BPM | SYSTOLIC BLOOD PRESSURE: 106 MMHG | WEIGHT: 147 LBS | RESPIRATION RATE: 14 BRPM

## 2020-10-21 LAB
BACTERIA: ABNORMAL /HPF
BILIRUBIN URINE: NEGATIVE
BLOOD, URINE: ABNORMAL
CASTS 2: ABNORMAL /LPF
CASTS UA: ABNORMAL /LPF
CHARACTER, URINE: CLEAR
COLOR: YELLOW
CRYSTALS, UA: ABNORMAL
EPITHELIAL CELLS, UA: ABNORMAL /HPF
GLUCOSE URINE: NEGATIVE MG/DL
KETONES, URINE: ABNORMAL
LEUKOCYTE ESTERASE, URINE: NEGATIVE
MISCELLANEOUS 2: ABNORMAL
NITRITE, URINE: NEGATIVE
PH UA: 5.5 (ref 5–9)
PROTEIN UA: NEGATIVE
RBC URINE: ABNORMAL /HPF
RENAL EPITHELIAL, UA: ABNORMAL
SPECIFIC GRAVITY, URINE: 1.02 (ref 1–1.03)
UROBILINOGEN, URINE: 1 EU/DL (ref 0–1)
WBC UA: ABNORMAL /HPF
YEAST: ABNORMAL

## 2020-10-21 PROCEDURE — 99213 OFFICE O/P EST LOW 20 MIN: CPT | Performed by: NURSE PRACTITIONER

## 2020-10-21 ASSESSMENT — ENCOUNTER SYMPTOMS
CHOKING: 0
NAUSEA: 0
SHORTNESS OF BREATH: 0
DIARRHEA: 0
COUGH: 0
EYE REDNESS: 0

## 2020-10-21 NOTE — PROGRESS NOTES
[facility]    Allergy & Asthma   [address] [facility], [address]  Ph:   [phone]  Fax:[phone]    Provider:  Dr. Chino Garcia:   Chief Complaint   Patient presents with    Procedure     Patient is here for allergy testing           HISTORY OF PRESENT ILLNESS: ESTABLISHED PATIENT HERE FOR EVALUATION    71-year-old female here today for eczema and rash on her arms, trunk and legs. Patient has brought pictures in which shows a severe atopic dermatitis on her arms extending down into her hands. She reports that she does not know why the rash occurs but it has occurred spontaneously for the last 4 years. She thinks it may be something in her home which is causing it such as mold but she is uncertain. She denies any nausea vomiting fever. Patient states that she has to be put on steroid burst Dosepaks, antibiotics when it does occur. She is taking these at least once a year. The rash also is on her arms and neck and legs at times. Patient states that she is not having any allergic type reactions today or have had recently. Her last episode of eczema was back in July. Otherwise patient states that her allergy symptoms are well under control. She does not take any antihistamines on a daily basis. She denies any nausea vomiting. Her throat is not sore. She has no rhinorrhea. Patient does not have fever. Review of Systems:  Review of Systems   HENT: Negative for congestion and sneezing. Eyes: Negative for redness. Respiratory: Negative for cough, choking and shortness of breath. Cardiovascular: Negative for chest pain and leg swelling. Gastrointestinal: Negative for diarrhea and nausea. Skin: Negative for rash. Allergic/Immunologic: Positive for environmental allergies. All other systems reviewed and are negative.         Past MedicalHistory:    Past Medical History:   Diagnosis Date    Abnormal Pap smear     Arthritis     Head: Normocephalic and atraumatic. Right Ear: Tympanic membrane, ear canal and external ear normal.      Left Ear: Tympanic membrane, ear canal and external ear normal.      Nose: Nose normal.      Mouth/Throat:      Mouth: Mucous membranes are moist.      Pharynx: Oropharynx is clear. Eyes:      Extraocular Movements: Extraocular movements intact. Conjunctiva/sclera: Conjunctivae normal.      Pupils: Pupils are equal, round, and reactive to light. Neck:      Musculoskeletal: Normal range of motion and neck supple. Cardiovascular:      Rate and Rhythm: Normal rate and regular rhythm. Pulses: Normal pulses. Heart sounds: Normal heart sounds. Pulmonary:      Effort: Pulmonary effort is normal.      Breath sounds: Normal breath sounds. Musculoskeletal: Normal range of motion. Skin:     General: Skin is warm and dry. Capillary Refill: Capillary refill takes less than 2 seconds. Neurological:      General: No focal deficit present. Mental Status: She is alert and oriented to person, place, and time. Mental status is at baseline. Psychiatric:         Mood and Affect: Mood normal.         Behavior: Behavior normal.         Thought Content:  Thought content normal.         Judgment: Judgment normal.             DATA:  Lab Review:    CBC:   Lab Results   Component Value Date    WBC 7.0 09/03/2020    RBC 4.32 09/03/2020    HGB 14.0 09/03/2020    HCT 42.1 09/03/2020    MCV 97.5 09/03/2020    MCH 32.4 09/03/2020    MCHC 33.3 09/03/2020    RDW 14.6 12/27/2017     09/03/2020          IgE   Date/Time Value Ref Range Status   09/03/2020 10:18 AM 49 <101 IU/mL Final     Comment:     [facility] [id number] [place] [address] [phone]      IgG   Date/Time Value Ref Range Status   09/03/2020 10:19  700 - 1600 mg/dL Final     Comment:     [facility] [address] [address] [phone]     IgA   Date/Time Value Ref Range Status   09/03/2020 10:19  70 - 400 mg/dL Final     Comment:     [facility] [address] [facility], [address] [place] [address] [phone]      IgM   Date/Time Value Ref Range Status   09/03/2020 10:19  (H) 40 - 230 mg/dL Final     Comment:     [facility] [address] [facility], [address] [place] [address] [phone]       No results found for: GEETA   No results found for: RF       No results found for this or any previous visit. No results found for this or any previous visit. PROCEDURES:        Skin Testing performed on: 10/21/2020        Last use of antihistamine (or other medication affecting response to histamine): greater than one week    Patient informed of risks of skin allergy testing mild, moderate, and severe including potential for anaphylaxis. Patient wishes to proceed. Consent signed: Yes    Location: Back  Testing preformed: Intradermal    Panel A Epictuaneous   Panel A  Epictuaneous    Site Allergen  W (mm) F  Site Allergen  W (mm) F   A1 Histamine positive control    A6 Cockroach Mix     A2 Glycerin negative control    A7 Feather Mix     A3 Dust mite Mix    A8 Mouse epithelia     A4 Cat Hair    A9 Cattle Epithelia     A5 Dog Ep.     A10 Horse Epithelia                Panel B Epictuaneous   Panel B  Epictuaneous    Site Allergen  W (mm) F  Site Allergen  W (mm) F   B11 Prashant Red/Green Tree    B16 Hackleberry Tree     B12 [place], [place] Tree    B17 JOHN, [place] Tree     B13 Leetta Gull mix tree    B18 [place], red Tree     B14 [facility] Tree    B19 Kresetice, white/eastern Tree     B15 [employer] tree    B20 [place], [facility] Tree                Panel C Epictuaneous   Panel C  Izooble    Site Allergen  W (mm) F  Site Allergen  W (mm) F   C21 Intelleflex, [employer] Tree    C26 Maple     C22 La telly, [place]    I32 John Grass     C23 Box Elder    C28 B[place]     C24 [employer] Mtn.     C29 7 grass mix     C25 Pecan    C30 Jung Grass                Panel D Epictuaneous   Panel D  Epictuaneous    Site Allergen  W (mm) goes to the front door. I have recommended to the patient that she follow-up with her PCP regarding hematuria. We will go ahead and repeat a urinalysis today. Should the rash occur again I recommend a thorough history and physical for the patient. The rash has responded to steroids. We need to consider doing a biopsy of the rash. I did explain to the patient she can get this done here or at her PCP or dermatologist wherever she can get in where the rash is going on. The patient does have topical steroid creams. She stated that the last time she had a rash she was given a steroid injection and placed on a small dose steroid burst pack which she responded to well    I did explain to the patient that it was difficult to determine what she is allergic to is likely environmental considering this only happens in July and once a year. I cannot give a definite diagnosis as the patient has come in in October and the rash did occur in July and did respond to steroids.   I have encouraged the patient should the rash returned that she would need to be reevaluated at that time when the rash is present so we could further evaluate with history physical and assessment    Total time sent with patient 30 minutes with greater than 50% in patient education    (Please note that portions of this note may have been completed with a voice recognition program.  Efforts were made to edit the dictation but occasionally words are mis-transcribed.)         Signed:  TAURUS Salinas CNP  10/21/2020  9:02 AM

## 2020-10-21 NOTE — PATIENT INSTRUCTIONS
Follow-up with PCP regarding hematuria.   We will repeat your urinalysis today and should it show any hematuria this needs and warrants further investigation by your primary care provider

## 2020-11-19 ENCOUNTER — OFFICE VISIT (OUTPATIENT)
Dept: UROLOGY | Age: 45
End: 2020-11-19
Payer: COMMERCIAL

## 2020-11-19 VITALS — BODY MASS INDEX: 27.25 KG/M2 | HEIGHT: 62 IN | TEMPERATURE: 98.1 F | WEIGHT: 148.1 LBS

## 2020-11-19 LAB
BILIRUBIN URINE: NEGATIVE
BLOOD URINE, POC: ABNORMAL
CHARACTER, URINE: CLEAR
COLOR, URINE: YELLOW
GLUCOSE URINE: NEGATIVE MG/DL
KETONES, URINE: NEGATIVE
LEUKOCYTE CLUMPS, URINE: NEGATIVE
NITRITE, URINE: NEGATIVE
PH, URINE: 6 (ref 5–9)
PROTEIN, URINE: NEGATIVE MG/DL
SPECIFIC GRAVITY, URINE: 1.02 (ref 1–1.03)
UROBILINOGEN, URINE: 0.2 EU/DL (ref 0–1)

## 2020-11-19 PROCEDURE — 99204 OFFICE O/P NEW MOD 45 MIN: CPT | Performed by: UROLOGY

## 2020-11-19 PROCEDURE — 81003 URINALYSIS AUTO W/O SCOPE: CPT | Performed by: UROLOGY

## 2020-11-19 NOTE — PROGRESS NOTES
Johann Henson MD  Urology Clinic Consultation / New Patient Visit    Patient:  Poncho Martinez  YOB: 1975  Date: 11/19/2020    HISTORY OF PRESENT ILLNESS:   The patient is a 41 y.o. female who presents today for evaluation of the following problems:      1. Microscopic hematuria    2. Asymptomatic microscopic hematuria         Overall the problem(s) : are worsening. Associated Symptoms: No dysuria, gross hematuria. Pain Severity:      Summary of old records: N/A  (Patient's old records, notes and chart reviewed and summarized above.)      Onset 1-2 month  Severity is described as moderate, moderate to severe. The course of symptoms over time is worsening. Alleviating factors: none  Worsening factors: none  Lower urinary tract symptoms: none  Current smoker  No kidney stones    I independently reviewed and verified the images and reports from:    Xr Abdomen (kub) (single Ap View)    Result Date: 12/27/2017  PROCEDURE: XR ABDOMEN LIMITED (KUB) CLINICAL INFORMATION: Abdominal pain. COMPARISON: No prior study. TECHNIQUE: A single supine image of the abdomen was obtained. FINDINGS: There is moderate gaseous distention of several centrally located small bowel loops, consistent with paralytic ileus. Contrast material is seen in the colon from recent CT scan. Kidneys are somewhat obscured. No definite renal or ureteral calculi are seen. There is a solitary phlebolith in the pelvis. Moderate mid abdominal paralytic ileus. **This report has been created using voice recognition software. It may contain minor errors which are inherent in voice recognition technology. ** Final report electronically signed by Dr. Dmitri Kuhn on 12/27/2017 9:59 AM    Ct Abdomen Pelvis W Iv Contrast Additional Contrast? Oral    Result Date: 12/26/2017  PROCEDURE: CT ABDOMEN PELVIS W IV CONTRAST CLINICAL INFORMATION: lower abdominal pain, with diarrhea, leukocytosis COMPARISON: Abdomen pelvis CT, 7 December densities are present similar to the most recent exam, likely representing subsegmental atelectasis versus less likely developing alveolar pneumonia. There appears similar to slightly improved. Gastroesophageal junction satisfactory. Osseous framework and overlying soft tissues are satisfactory. INTERVAL WORSENING IN THE APPEARANCE OF THE ABDOMEN, PREDOMINATELY WITH RESPECT TO THE BOWEL, NOW WITH FLUID ATTENUATION THROUGH THE MAJORITY OF THE BOWEL LUMEN AS WELL AS MINIMAL DEVELOPMENT OF BOWEL DISTENTION INVOLVING THE SMALL BOWEL, ESPECIALLY THE JEJUNUM AND ILEUM, DIAMETER OF AS MUCH AS 3.5 CM. CHARACTERISTICS OF ILEUS VERSUS LESS LIKELY SMALL BOWEL OBSTRUCTION. CHARACTERISTICS OF ENTERITIS ALSO. **This report has been created using voice recognition software. It may contain minor errors which are inherent in voice recognition technology. ** Final report electronically signed by Dr. Rinku Angel on 12/26/2017 12:30 PM    Urinalysis today:  Results for POC orders placed in visit on 11/19/20   POCT Urinalysis No Micro (Auto)   Result Value Ref Range    Glucose, Ur Negative NEGATIVE mg/dl    Bilirubin Urine Negative     Ketones, Urine Negative NEGATIVE    Specific Gravity, Urine 1.025 1.002 - 1.030    Blood, UA POC Moderate (A) NEGATIVE    pH, Urine 6.00 5.0 - 9.0    Protein, Urine Negative NEGATIVE mg/dl    Urobilinogen, Urine 0.20 0.0 - 1.0 eu/dl    Nitrite, Urine Negative NEGATIVE    Leukocyte Clumps, Urine Negative NEGATIVE    Color, Urine Yellow [id number]    Character, Urine Clear [id number]       Last BUN and creatinine:  Lab Results   Component Value Date    BUN 12 09/03/2020     Lab Results   Component Value Date    CREATININE 0.6 09/03/2020       Imaging Reviewed during this Office Visit:   (results were independently reviewed by physician and radiology report verified)    PAST MEDICAL, FAMILY AND SOCIAL HISTORY:  Past Medical History:   Diagnosis Date    Abnormal Pap smear     Arthritis     Depression     Mental disorder     history of depression     Past Surgical History:   Procedure Laterality Date    COLONOSCOPY Left 12/29/2017    COLONOSCOPY WITH BIOPSY performed by Vimal Lopez MD at [address]  2007    ND EGD TRANSORAL BIOPSY SINGLE/MULTIPLE Left 12/29/2017    EGD [facility]SY [facility]     Family History   Problem Relation Age of Onset    Miscarriages / Stillbirths Maternal Grandmother     Early Death Neg Hx      No outpatient medications have been marked as taking for the 11/19/20 encounter (Office Visit) with Carloz Yost MD.       Patient has no known allergies. Social History     Tobacco Use   Smoking Status Current Every Day Smoker    Packs/day: 0.50    Years: 20.00    Pack years: 10.00    Types: Cigarettes   Smokeless Tobacco Never Used       Social History     Substance and Sexual Activity   Alcohol Use Yes    Comment: patient state \"a couple of beers a week\"       REVIEW OF SYSTEMS:  Constitutional: negative  Eyes: negative  Respiratory: negative  Cardiovascular: negative  Gastrointestinal: negative  Genitourinary: negative except for what is in HPI  Musculoskeletal: negative  Skin: negative   Neurological: negative  Hematological/Lymphatic: negative  Psychological: negative    Physical Exam:    This a 41 y.o. female      Vitals:    11/19/20 0750   Temp: 98.1 °F (36.7 °C)     Constitutional: Patient in no acute distress. Neuro: Alert and oriented to person, place and time. Psych: mood and affect normal  HEENT negative  Lungs: Respiratory effort is normal  Cardiovascular: Normal peripheral pulses  Abdomen: Soft, non-tender, non-distended   Lymphatics: No palpable lymphadenopathy. Bladder non-tender and not distended. Assessment and Plan      1. Microscopic hematuria    2.  Asymptomatic microscopic hematuria           Plan:      CT urogram  Cystoscopy in urology center           Nicol Aquino, MD [facility]

## 2020-11-19 NOTE — PATIENT INSTRUCTIONS
You may receive a survey regarding the care you received during your visit. Your input is valuable to us. We encourage you to complete and return your survey. We hope you will choose us in the future for your healthcare needs. Patient Education        Stopping Smoking: Care Instructions  Your Care Instructions     Cigarette smokers crave the nicotine in cigarettes. Giving it up is much harder than simply changing a habit. Your body has to stop craving the nicotine. It is hard to quit, but you can do it. There are many tools that people use to quit smoking. You may find that combining tools works best for you. There are several steps to quitting. First you get ready to quit. Then you get support to help you. After that, you learn new skills and behaviors to become a nonsmoker. For many people, a necessary step is getting and using medicine. Your doctor will help you set up the plan that best meets your needs. You may want to attend a smoking cessation program to help you quit smoking. When you choose a program, look for one that has proven success. Ask your doctor for ideas. You will greatly increase your chances of success if you take medicine as well as get counseling or join a cessation program.  Some of the changes you feel when you first quit tobacco are uncomfortable. Your body will miss the nicotine at first, and you may feel short-tempered and grumpy. You may have trouble sleeping or concentrating. Medicine can help you deal with these symptoms. You may struggle with changing your smoking habits and rituals. The last step is the tricky one: Be prepared for the smoking urge to continue for a time. This is a lot to deal with, but keep at it. You will feel better. Follow-up care is a key part of your treatment and safety. Be sure to make and go to all appointments, and call your doctor if you are having problems.  It's also a good idea to know your test results and keep a list of the medicines you stress and anxiety. · Some people find hypnosis, acupuncture, and massage helpful for ending the smoking habit. · Eat a healthy diet and get regular exercise. Having healthy habits will help your body move past its craving for nicotine. · Be prepared to keep trying. Most people are not successful the first few times they try to quit. Do not get mad at yourself if you smoke again. Make a list of things you learned and think about when you want to try again, such as next week, next month, or next year. Where can you learn more? Go to [email or web address] and sign in to your [employer] account. Enter H093 in the [employer] box to learn more about \"Stopping Smoking: Care Instructions. \"     If you do not have an account, please click on the \"Sign Up Now\" link. Current as of: March 12, 2020               Content Version: 12.6  © 6009-2164 [employer]. Care instructions adapted under license by [facility] ([facility]). If you have questions about a medical condition or this instruction, always ask your healthcare professional. [address] any warranty or liability for your use of this information.

## 2020-11-20 ENCOUNTER — TELEPHONE (OUTPATIENT)
Dept: UROLOGY | Age: 45
End: 2020-11-20

## 2020-11-20 NOTE — TELEPHONE ENCOUNTER
Patient with a history of esophageal cancer, per son patient last had chemo done last December  Patient currently has an esophageal stent in place  · CTA showing: Infiltrating soft tissue in the mediastinum surrounding the patient's esophageal stent extending to the level of the descending thoracic aorta as well as the vertebral column  Endplates are irregular at T4-5, T5-6, and T6-7 with bony sclerosis present suggesting either direct extension of tumor from the patient's known esophageal cancer versus discitis and osteomyelitis  Scheduled ct on Nov 25 and f/u in UC for cysto on Dec 3.  Called patient she verbalized understanding

## 2020-11-25 ENCOUNTER — HOSPITAL ENCOUNTER (OUTPATIENT)
Dept: CT IMAGING | Age: 45
Discharge: HOME | End: 2020-11-25
Payer: COMMERCIAL

## 2020-11-25 PROCEDURE — 74178 CT ABD&PLV WO CNTR FLWD CNTR: CPT

## 2020-11-25 PROCEDURE — 6360000004 HC RX CONTRAST MEDICATION: Performed by: UROLOGY

## 2020-11-25 RX ADMIN — IOPAMIDOL 85 ML: 755 INJECTION, SOLUTION INTRAVENOUS at 07:10

## 2020-12-03 ENCOUNTER — HOSPITAL ENCOUNTER (OUTPATIENT)
Dept: UROLOGY | Age: 45
Discharge: HOME | End: 2020-12-03
Payer: COMMERCIAL

## 2020-12-03 VITALS
BODY MASS INDEX: 27.25 KG/M2 | SYSTOLIC BLOOD PRESSURE: 157 MMHG | HEIGHT: 62 IN | RESPIRATION RATE: 18 BRPM | WEIGHT: 148.1 LBS | TEMPERATURE: 98.2 F | HEART RATE: 68 BPM | DIASTOLIC BLOOD PRESSURE: 87 MMHG

## 2020-12-03 LAB
BILIRUBIN URINE: NEGATIVE
BLOOD URINE, POC: ABNORMAL
CHARACTER, URINE: CLEAR
COLOR, URINE: YELLOW
GLUCOSE URINE: NEGATIVE MG/DL
KETONES, URINE: NEGATIVE
LEUKOCYTE CLUMPS, URINE: NEGATIVE
NITRITE, URINE: NEGATIVE
PH, URINE: 7 (ref 5–9)
PROTEIN, URINE: NEGATIVE MG/DL
SPECIFIC GRAVITY, URINE: 1.02 (ref 1–1.03)
UROBILINOGEN, URINE: 0.2 EU/DL (ref 0–1)

## 2020-12-03 PROCEDURE — 6370000000 HC RX 637 (ALT 250 FOR IP): Performed by: UROLOGY

## 2020-12-03 PROCEDURE — 81003 URINALYSIS AUTO W/O SCOPE: CPT

## 2020-12-03 PROCEDURE — 52000 CYSTOURETHROSCOPY: CPT

## 2020-12-03 RX ORDER — CIPROFLOXACIN 500 MG/1
500 TABLET, FILM COATED ORAL EVERY 12 HOURS SCHEDULED
Status: DISCONTINUED | OUTPATIENT
Start: 2020-12-03 | End: 2020-12-04 | Stop reason: HOSPADM

## 2020-12-03 RX ADMIN — CIPROFLOXACIN HYDROCHLORIDE 500 MG: 500 TABLET, FILM COATED ORAL at 08:41

## 2020-12-03 NOTE — PROGRESS NOTES
0730 Patient arrived in [facility]rology Center for Cystoscopy  0751 This procedure has been fully reviewed with the patient and written informed consent has been obtained. 7594 Procedure started with   0830 Procedure completed; patient tolerated well. Dr. Cesar Litten talked to patient in length about procedure findings. 1542 Patient discharged. PLAN  Given Cipro 500 Mg per Dr. Cesar Litten prior to discharge. Follow up in six months to see if any more infections are present.

## 2020-12-03 NOTE — H&P
History and Physical    Patient:  Gino Moore  MRN: [id number]  YOB: 1975    CHIEF COMPLAINT:  BRIEN HEMATURIA    HISTORY OF PRESENT ILLNESS:   The patient is a 41 y.o. female who presents with BRIEN HEMATURIA    Patient's old records, notes and chart reviewed and summarized above. Past Medical History:    Past Medical History:   Diagnosis Date    Abnormal Pap smear     Arthritis     Depression     Mental disorder     history of depression       Past Surgical History:    Past Surgical History:   Procedure Laterality Date    COLONOSCOPY Left 12/29/2017    COLONOSCOPY WITH BIOPSY performed [facility] at [facility]  [facility]    ME EGD TRANSORAL BIOPSY SINGLE/MULTIPLE Left 12/29/2017    EGD BIOPSY performed by [facility]     Medications Prior to Admission:    Prior to Admission medications    Medication Sig Start Date End Date Taking? Authorizing Provider   meclizine (ANTIVERT) 25 MG tablet Take 1/2 tablet to 1 full tablet by mouth three times a day prn for dizziness  Patient not taking: Reported on 11/19/2020 7/30/20   Bruce Smoker, APRN - CNP   PARoxetine (PAXIL) 10 MG tablet Take 1 tablet by mouth daily  Patient not taking: Reported on 11/19/2020 1/1/18   Lilly Sharp DO       Allergies:  Patient has no known allergies.     Social History:    Social History     Socioeconomic History    Marital status:      Spouse name: Not on file    Number of children: Not on file    Years of education: Not on file    Highest education level: Not on file   Occupational History    Occupation:    Social Needs    Financial resource strain: Not on file    Food insecurity     Worry: Not on file     Inability: Not on file    Transportation needs     Medical: Not on file     Non-medical: Not on file   Tobacco Use    Smoking status: Current Every Day Smoker     Packs/day: 0.50     Years: 20.00     Pack years: distended. LABS:   No results for input(s): WBC, HGB, HCT, MCV, PLT in the last 72 hours. No results for input(s): NA, K, CL, CO2, PHOS, BUN, CREATININE in the last 72 hours.     Invalid input(s): CA  No results found for: PSA        Urinalysis:   Recent Labs     12/03/20  0752   COLORU Yellow   UROBILINOGEN 0.20   BILIRUBINUR Negative   BLOODU Large*        -----------------------------------------------------------------      Assessment and Plan   Impression:    Patient Active Problem List   Diagnosis    High-risk pregnancy    Anxiety    Asymptomatic microscopic hematuria       Plan:     Consent obtained; CYSTOSCOPY in OR today    Lukas Rivero M.D  8:13 AM 12/3/2020

## 2021-01-19 ENCOUNTER — OFFICE VISIT (OUTPATIENT)
Dept: FAMILY MEDICINE CLINIC | Age: 46
End: 2021-01-19
Payer: COMMERCIAL

## 2021-01-19 VITALS
WEIGHT: 148 LBS | RESPIRATION RATE: 16 BRPM | SYSTOLIC BLOOD PRESSURE: 134 MMHG | HEIGHT: 63 IN | DIASTOLIC BLOOD PRESSURE: 82 MMHG | HEART RATE: 76 BPM | OXYGEN SATURATION: 97 % | BODY MASS INDEX: 26.22 KG/M2 | TEMPERATURE: 97.8 F

## 2021-01-19 DIAGNOSIS — H60.311 ACUTE DIFFUSE OTITIS EXTERNA OF RIGHT EAR: Primary | ICD-10-CM

## 2021-01-19 PROCEDURE — 99213 OFFICE O/P EST LOW 20 MIN: CPT | Performed by: NURSE PRACTITIONER

## 2021-01-19 RX ORDER — CIPROFLOXACIN AND DEXAMETHASONE 3; 1 MG/ML; MG/ML
4 SUSPENSION/ DROPS AURICULAR (OTIC) 2 TIMES DAILY
Qty: 1 BOTTLE | Refills: 0 | Status: SHIPPED | OUTPATIENT
Start: 2021-01-19 | End: 2021-01-26

## 2021-01-19 RX ORDER — AMOXICILLIN AND CLAVULANATE POTASSIUM 875; 125 MG/1; MG/1
1 TABLET, FILM COATED ORAL 2 TIMES DAILY
Qty: 20 TABLET | Refills: 0 | Status: SHIPPED | OUTPATIENT
Start: 2021-01-19 | End: 2021-01-29

## 2021-01-19 ASSESSMENT — PATIENT HEALTH QUESTIONNAIRE - PHQ9
SUM OF ALL RESPONSES TO PHQ QUESTIONS 1-9: 0
SUM OF ALL RESPONSES TO PHQ9 QUESTIONS 1 & 2: 0
SUM OF ALL RESPONSES TO PHQ QUESTIONS 1-9: 0

## 2021-01-19 NOTE — PROGRESS NOTES
SUBJECTIVE:  Mary [place] is a 41 y.o. y/o female that presents with Ear Fullness (with drainage started last night- swelling and pain)  . HPI:  Symptoms have been present for 3 day(s). Inciting incident or history of trauma? no  Decreased hearing? No  Ear tenderness? Yes  Ear drainage? Yes  Feeling of fullness? Yes  Radiation of the pain? No  Dizziness or pre-syncope? No  Affected by position or head movment? No  Sore throat, rhinorrhea or sinus congestion? No  Hx of Bruxism? No  Treatment tried and response - nothing      OBJECTIVE:  /82   Pulse 76   Temp 97.8 °F (36.6 °C)   Resp 16   Ht 5' 3\" (1.6 m)   Wt 148 lb (67.1 kg)   SpO2 97%   BMI 26.22 kg/m²   General appearance: alert, well appearing, and in no distress. HEENT:  right canal red, inflamed and draining and external ear also inflamed and red/swollen, Nasal mucosa wnl, oropharynx normal without any lesions  Neck:  Supple without masses, no cervical adenopathy  CVS exam: normal rate, regular rhythm, normal S1, S2, no murmurs, rubs, clicks or gallops. Chest: clear to auscultation, no wheezes, rales or rhonchi, symmetric air entry. Skin exam - normal coloration and turgor, no rashes, no suspicious skin lesions noted. Psych:  No evidence of anxiety or depression      ASSESSMENT & PLAN  Izabella Fernandez was seen today for ear fullness. Diagnoses and all orders for this visit:    Acute diffuse otitis externa of right ear  -     amoxicillin-clavulanate (AUGMENTIN) 875-125 MG per tablet; Take 1 tablet by mouth 2 times daily for 10 days  -     ciprofloxacin-dexamethasone (CIPRODEX) 0.3-0.1 % otic suspension; Place 4 drops into the right ear 2 times daily for 7 days      - start oral ABx and ciprodex   - f/u if no improvement or go to ED if worse    Return if symptoms worsen or fail to improve.

## 2021-01-19 NOTE — PATIENT INSTRUCTIONS
Patient Education        Swimmer's Ear: Care Instructions  Your Care Instructions     Swimmer's ear (otitis externa) is inflammation or infection of the ear canal. This is the passage that leads from the outer ear to the eardrum. Any water, sand, or other debris that gets into the ear canal and stays there can cause swimmer's ear. Putting cotton swabs or other items in the ear to clean it can also cause this problem. Swimmer's ear can be very painful. But you can treat the pain and infection with medicines. You should feel better in a few days. Follow-up care is a key part of your treatment and safety. Be sure to make and go to all appointments, and call your doctor if you are having problems. It's also a good idea to know your test results and keep a list of the medicines you take. How can you care for yourself at home? Cleaning and care  · Use antibiotic drops as your doctor directs. · Do not insert ear drops (other than the antibiotic ear drops) or anything else into the ear unless your doctor has told you to. · Avoid getting water in the ear until the problem clears up. Use cotton lightly coated with petroleum jelly as an earplug. Do not use plastic earplugs. · Use a hair dryer set on low to carefully dry the ear after you shower. · To ease ear pain, hold a warm washcloth against your ear. · Take pain medicines exactly as directed. ? If the doctor gave you a prescription medicine for pain, take it as prescribed. ? If you are not taking a prescription pain medicine, ask your doctor if you can take an over-the-counter medicine. Inserting ear drops  · Warm the drops to body temperature by rolling the container in your hands. Or you can place it in a cup of warm water for a few minutes. · Lie down, with your ear facing up. · Place drops inside the ear. Follow your doctor's instructions (or the directions on the label) for how many drops to use. Gently wiggle the outer ear or pull the ear up and back to help the drops get into the ear. · It's important to keep the liquid in the ear canal for 3 to 5 minutes. When should you call for help? Call your doctor now or seek immediate medical care if:    · You have a new or higher fever.     · You have new or worse pain, swelling, warmth, or redness around or behind your ear.     · You have new or increasing pus or blood draining from your ear. Watch closely for changes in your health, and be sure to contact your doctor if:    · You are not getting better after 2 days (48 hours). Where can you learn more? Go to https://[employer]. org and sign in to your [employer] account. Enter C706 in the Kamida box to learn more about \"Swimmer's Ear: Care Instructions. \"     If you do not have an account, please click on the \"Sign Up Now\" link. Current as of: April 15, 2020               Content Version: 12.6  © 7112-5881 [employer]. Care instructions adapted under license by [facility] ([facility]). If you have questions about a medical condition or this instruction, always ask your healthcare professional. [address] any warranty or liability for your use of this information.

## 2021-01-20 ENCOUNTER — HOSPITAL ENCOUNTER (EMERGENCY)
Age: 46
Discharge: HOME | End: 2021-01-20
Attending: EMERGENCY MEDICINE
Payer: COMMERCIAL

## 2021-01-20 VITALS
OXYGEN SATURATION: 99 % | SYSTOLIC BLOOD PRESSURE: 129 MMHG | HEART RATE: 75 BPM | BODY MASS INDEX: 25.69 KG/M2 | WEIGHT: 145 LBS | TEMPERATURE: 99.4 F | RESPIRATION RATE: 16 BRPM | DIASTOLIC BLOOD PRESSURE: 76 MMHG

## 2021-01-20 DIAGNOSIS — H60.391 INFECTIVE OTITIS EXTERNA OF RIGHT EAR: Primary | ICD-10-CM

## 2021-01-20 DIAGNOSIS — H60.11 CELLULITIS OF AURICLE OF RIGHT EAR: ICD-10-CM

## 2021-01-20 PROCEDURE — 99283 EMERGENCY DEPT VISIT LOW MDM: CPT

## 2021-01-20 PROCEDURE — 6370000000 HC RX 637 (ALT 250 FOR IP): Performed by: PHYSICIAN ASSISTANT

## 2021-01-20 RX ORDER — NAPROXEN 250 MG/1
500 TABLET ORAL ONCE
Status: COMPLETED | OUTPATIENT
Start: 2021-01-20 | End: 2021-01-20

## 2021-01-20 RX ORDER — HYDROCODONE BITARTRATE AND ACETAMINOPHEN 5; 325 MG/1; MG/1
1 TABLET ORAL ONCE
Status: COMPLETED | OUTPATIENT
Start: 2021-01-20 | End: 2021-01-20

## 2021-01-20 RX ORDER — HYDROCODONE BITARTRATE AND ACETAMINOPHEN 5; 325 MG/1; MG/1
1 TABLET ORAL EVERY 6 HOURS PRN
Qty: 10 TABLET | Refills: 0 | Status: SHIPPED | OUTPATIENT
Start: 2021-01-20 | End: 2021-01-23

## 2021-01-20 RX ORDER — NAPROXEN 500 MG/1
500 TABLET ORAL 2 TIMES DAILY
Qty: 60 TABLET | Refills: 0 | Status: SHIPPED | OUTPATIENT
Start: 2021-01-20

## 2021-01-20 RX ADMIN — HYDROCODONE BITARTRATE AND ACETAMINOPHEN 1 TABLET: 5; 325 TABLET ORAL at 10:22

## 2021-01-20 RX ADMIN — NAPROXEN 500 MG: 250 TABLET ORAL at 10:22

## 2021-01-20 ASSESSMENT — ENCOUNTER SYMPTOMS
BACK PAIN: 0
COLOR CHANGE: 0
SHORTNESS OF BREATH: 0
ABDOMINAL PAIN: 0
COUGH: 0

## 2021-01-20 ASSESSMENT — PAIN SCALES - GENERAL: PAINLEVEL_OUTOF10: 8

## 2021-01-20 ASSESSMENT — PAIN DESCRIPTION - ORIENTATION: ORIENTATION: RIGHT

## 2021-01-20 ASSESSMENT — PAIN DESCRIPTION - FREQUENCY: FREQUENCY: CONTINUOUS

## 2021-01-20 NOTE — ED PROVIDER NOTES
[facility]  eMERGENCY dEPARTMENT eNCOUnter          CHIEF COMPLAINT       Chief Complaint   Patient presents with    Otalgia       Nurses Notes reviewed and I agree except as noted inthe HPI. HISTORY OF PRESENT ILLNESS    Lakhwinder Mead is a 41 y.o. female who presents to the Emergency Department for the evaluation of right ear pain. Patient reports symptoms began 2 days ago and she saw her PCP yesterday and was diagnosed with an outer ear infection. She was started on oral antibiotics and eardrops but states that the last time she tried to put the eardrops in, it seemed as though they could not penetrate her ear. She states that she had them in for quite a while and could not hear while they were in. After sitting upright, she states the drops came out of her ear and her hearing returned. She otherwise denies any hearing loss, fevers, vomiting. She reports increased pain and swelling to the right ear today prompting her ED evaluation. The HPI was provided by the patient. REVIEW OF SYSTEMS     Review of Systems   Constitutional: Negative for chills and fever. HENT: Positive for ear pain. Respiratory: Negative for cough and shortness of breath. Cardiovascular: Negative for chest pain. Gastrointestinal: Negative for abdominal pain. Musculoskeletal: Negative for back pain. Skin: Negative for color change. Neurological: Negative for syncope and headaches. PAST MEDICAL HISTORY    has a past medical history of Abnormal Pap smear, Arthritis, Depression, and Mental disorder. SURGICAL HISTORY      has a past surgical history that includes knee surgery (2007); pr egd transoral biopsy single/multiple (Left, 12/29/2017); and Colonoscopy (Left, 12/29/2017).     CURRENT MEDICATIONS       Discharge Medication List as of 1/20/2021 11:38 AM      CONTINUE these medications which have NOT CHANGED    Details   amoxicillin-clavulanate (AUGMENTIN) 875-125 MG per Musculoskeletal: Normal range of motion. Cardiovascular:      Rate and Rhythm: Normal rate. Pulmonary:      Effort: Pulmonary effort is normal. No respiratory distress. Skin:     General: Skin is warm and dry. Neurological:      General: No focal deficit present. Mental Status: She is alert. Psychiatric:         Mood and Affect: Mood normal.             DIFFERENTIAL DIAGNOSIS:   Differential diagnoses are discussed    DIAGNOSTIC RESULTS     EKG: All EKG's are interpreted by the Emergency Department Physician who either signs or Co-signsthis chart in the absence of a cardiologist.        RADIOLOGY: non-plain film images(s) such as CT, Ultrasound and MRI are read by the radiologist.    No orders to display       LABS:    Labs Reviewed - No data to display    EMERGENCY DEPARTMENT COURSE:   Vitals:    Vitals:    01/20/21 0901   BP: 129/76   Pulse: 75   Resp: 16   Temp: 99.4 °F (37.4 °C)   TempSrc: Oral   SpO2: 99%   Weight: 145 lb (65.8 kg)      9:53 AM EST: The patient was seen and evaluated. Patient presents for complaints of right ear pain. It began 2 days ago, diagnosed with otitis externa yesterday and notably worsened today. On exam, she has significant tenderness, edema and some erythema without any notable erythema, swelling or tenderness over the mastoid. Vital signs were reassuring. Pain improved after placement of wick with further improvement after Norco and Naprosyn. She was evaluated by attending provider. Case was discussed with ENT who is agreeable with discharge home and will follow up with her Friday at noon. Patient advised of appointment time. They did suggest culture if any drainage was present. However, there is no active drainage on my follow-up exam and culture will be withheld at this time. Return precautions were discussed with patient who was agreeable with the above plan and denied further needs upon time of discharge.     CRITICAL CARE: None    CONSULTS:  Jannette Anne CNP, ENT    PROCEDURES:  None    FINAL IMPRESSION      1. Infective otitis externa of right ear    2. Cellulitis of auricle of right ear          DISPOSITION/PLAN   Discharge    PATIENT REFERRED TO:  [facility]  [address] [facility] [address]  Call today  to schedule follow-up in the next 1-2 days for recheck    [address] [facility] [address] [address] EMERGENCY DEPT  [address]    If symptoms worsen      DISCHARGEMEDICATIONS:  Discharge Medication List as of 1/20/2021 11:38 AM      START taking these medications    Details   HYDROcodone-acetaminophen (NORCO) 5-325 MG per tablet Take 1 tablet by mouth every 6 hours as needed for Pain for up to 3 days. WARNING: This medication may make you drowsy.  Do not operate heavy machinery or motor vehicles during use., Disp-10 tablet, R-0Print      naproxen (NAPROSYN) 500 MG tablet Take 1 tablet by mouth 2 times daily Do not take with ibuprofen, advil, motrin, or aleve., Disp-60 tablet, R-0Print             (Please note that portions of this note were completedwith a voice recognition program.  Efforts were made to edit the dictations but occasionally words are mis-transcribed.)       Poncho Chun PA-C  01/20/21 4309

## 2021-01-20 NOTE — ED NOTES
Pt presents to the ER for R ear infection. Pt was at her family doc yesterday and given an antibiotic and ear drops. Pt states that the ear is too swollen to get the drops in and she has taken 2 does of her antibiotic.      Sophie Conley  01/20/21 9779

## 2021-01-22 ENCOUNTER — OFFICE VISIT (OUTPATIENT)
Dept: ENT CLINIC | Age: 46
End: 2021-01-22
Payer: COMMERCIAL

## 2021-01-22 VITALS
TEMPERATURE: 98.1 F | HEART RATE: 76 BPM | WEIGHT: 143.3 LBS | RESPIRATION RATE: 16 BRPM | SYSTOLIC BLOOD PRESSURE: 118 MMHG | HEIGHT: 63 IN | BODY MASS INDEX: 25.39 KG/M2 | DIASTOLIC BLOOD PRESSURE: 80 MMHG

## 2021-01-22 DIAGNOSIS — H60.313 ACUTE DIFFUSE OTITIS EXTERNA OF BOTH EARS: Primary | ICD-10-CM

## 2021-01-22 PROCEDURE — 92504 EAR MICROSCOPY EXAMINATION: CPT | Performed by: OTOLARYNGOLOGY

## 2021-01-22 PROCEDURE — 99204 OFFICE O/P NEW MOD 45 MIN: CPT | Performed by: OTOLARYNGOLOGY

## 2021-01-22 ASSESSMENT — ENCOUNTER SYMPTOMS
SORE THROAT: 0
VOICE CHANGE: 0
DIARRHEA: 0
COUGH: 0
VOMITING: 0
STRIDOR: 0
RHINORRHEA: 0
ABDOMINAL PAIN: 0
FACIAL SWELLING: 0
NAUSEA: 0
APNEA: 0
CHEST TIGHTNESS: 0
SINUS PRESSURE: 0
TROUBLE SWALLOWING: 0
SHORTNESS OF BREATH: 0
WHEEZING: 0
COLOR CHANGE: 0
CHOKING: 0

## 2021-01-22 NOTE — PROGRESS NOTES
[facility] [place] [facility]  [facility], [facility]SE AND THRO[facility]  [address] [place], [place] [address]  Dept: [phone]  Dept Fax: [phone]  Loc: [phone]    Snehal Fletcher is a 41 y.o. female who was referred byNo ref. provider found for:  Chief Complaint   Patient presents with    Otitis Externa     New patient here for evaluation of her otitis externa, bilateral.  Referred by [facility] ER. Delsa Severance HPI:     Snehal Fletcher is a 41 y.o. female who presents today for evaluation of otitis externa, bilateral.  Referred by [facility] ER. Right ear pain on 1/20/21 at [facility] ER-2 days prior saw PCP and was diagnosed with an outer ear infection. Was put on Augmentin for 10 days and Ciprodex for 7 days. States that if felt like the drops could not penetrate hear ear. An otowick was put in the right ear. She felt better this morning. Her left ear is bothering her too    She has not had problems with her ears before. She has not been seen by an ENT. Has muffled hearing in both ears. History:     No Known Allergies  Current Outpatient Medications   Medication Sig Dispense Refill    HYDROcodone-acetaminophen (NORCO) 5-325 MG per tablet Take 1 tablet by mouth every 6 hours as needed for Pain for up to 3 days. WARNING: This medication may make you drowsy. Do not operate heavy machinery or motor vehicles during use. 10 tablet 0    naproxen (NAPROSYN) 500 MG tablet Take 1 tablet by mouth 2 times daily Do not take with ibuprofen, advil, motrin, or aleve.  60 tablet 0    amoxicillin-clavulanate (AUGMENTIN) 875-125 MG per tablet Take 1 tablet by mouth 2 times daily for 10 days 20 tablet 0    ciprofloxacin-dexamethasone (CIPRODEX) 0.3-0.1 % otic suspension Place 4 drops into the right ear 2 times daily for 7 days 1 Bottle 0    PARoxetine (PAXIL) 10 MG tablet Take 1 tablet by mouth daily 30 tablet 3  meclizine (ANTIVERT) 25 MG tablet Take 1/2 tablet to 1 full tablet by mouth three times a day prn for dizziness (Patient not taking: Reported on 1/22/2021) 15 tablet 0     No current facility-administered medications for this visit. Past Medical History:   Diagnosis Date    Abnormal Pap smear     Arthritis     Depression     Mental disorder     history of depression      Past Surgical History:   Procedure Laterality Date    COLONOSCOPY Left 12/29/2017    COLONOSCOPY WITH BIOPSY performed by Arvil Councilman, MD at [address]  2007    [place] EGD TRANSORAL BIOPSY SINGLE/MULTIPLE Left 12/29/2017    EGD BIOPSY [facility] by Arvil Councilman, MD [facility] [address] [facility]     Family History   Problem Relation Age of Onset    [de-identified] / Stillbirths Maternal Grandmother     Early Death Neg Hx      Social History     Tobacco Use    Smoking status: Current Every Day Smoker     Packs/day: 0.50     Years: 20.00     Pack years: 10.00     Types: Cigarettes    Smokeless tobacco: Never Used   Substance Use Topics    Alcohol use: Yes     Comment: patient state \"a couple of beers a week\"       Subjective:       Review of Systems   Constitutional: Negative for activity change, appetite change, chills, diaphoresis, fatigue, fever and unexpected weight change. HENT: Negative for congestion, dental problem, ear discharge, ear pain, facial swelling, hearing loss, mouth sores, nosebleeds, postnasal drip, rhinorrhea, sinus pressure, sneezing, sore throat, tinnitus, trouble swallowing and voice change. Eyes: Negative for visual disturbance. Respiratory: Negative for apnea, cough, choking, chest tightness, shortness of breath, wheezing and stridor. Cardiovascular: Negative for chest pain, palpitations and leg swelling. Gastrointestinal: Negative for abdominal pain, diarrhea, nausea and vomiting. Endocrine: Negative for cold intolerance, heat intolerance, polydipsia and polyuria. Genitourinary: Negative for dysuria, enuresis and hematuria. Musculoskeletal: Negative for arthralgias, gait problem, neck pain and neck stiffness. Skin: Negative for color change and rash. Allergic/Immunologic: Negative for environmental allergies, food allergies and immunocompromised state. Neurological: Negative for dizziness, syncope, facial asymmetry, speech difficulty, light-headedness and headaches. Hematological: Negative for adenopathy. Does not bruise/bleed easily. Psychiatric/Behavioral: Negative for confusion and sleep disturbance. The patient is not nervous/anxious. Objective:     /80 (Site: Right Upper Arm, Position: Sitting)   Pulse 76   Temp 98.1 °F (36.7 °C) (Infrared)   Resp 16   Ht 5' 3\" (1.6 m)   Wt 143 lb 4.8 oz (65 kg)   BMI 25.38 kg/m²     Physical Exam  HENT:      Right Ear: Drainage, swelling and tenderness present. Left Ear: Swelling and tenderness present. Binocular Microscopic Exam:  Both ears are examined and cleaned under microscopic vision. Bilateral moderate otitis externa. Bilateral otowick placement with inflation by Ciprodex    Data:  All of the past medical history, past surgical history, family history,social history, allergies and current medications were reviewed with the patient. Assessment & Plan   Diagnoses and all orders for this visit:     Diagnosis Orders   1. Acute otitis externa of right ear, unspecified type  Culture, Aerobic    WA EAR MICROSCOPY EXAMINATION       The findings were explained and her questions were answered. Options were discussed including continuing the Ciprodex drops 4 drops 2 X day. Continue the oral antibiotic pending culture results. A culture of the right ear was taken. Schedule with Jannette on Monday to have otowicks taken out. ADDENDUM: Culture grew Pseudomonas aeruginosa with routine sensitivity pattern. Delsa Severance I, Emanuel rAce CMA ([facility]), am scribing for, and in the presence of Dr. Cameron Patino. Electronically signed by Marc Lim CMA ([facility]) on 1/22/21 at 12:12 PM EST. (Please note that portions of this note were completed with a voice recognition program. Efforts were made to edit the dictations butoccasionally words are mis-transcribed.)    I agree to the above documentation placed by my scribe. I have personally evaluated this patient. Additional findings are as noted. I reviewed the scribe's note and agree with the documented findings and plan of care. Any areas of disagreement are corrected. I agree with the chief complaint, past medical history, past surgical history, allergies, medications, social and family history as documented unless otherwise noted below.      Electronically signed by Torin Boo MD on 2/7/2021 at 4:53 PM

## 2021-01-22 NOTE — ED PROVIDER NOTES
[address]    Pt Name: Mary Branch  MRN: [id number]  [place] 1975  Date of evaluation: 1/22/21        Physician Note:    I have personally performed and/or participated in the history, exam and medical decision making and agree with all pertinent clinical information. I have also reviewed and agree with the past medical, family and social history unless otherwise noted. I have personally performed a face to face diagnostic evaluation on this patient. I have reviewed the mid-levels findings and agree. History: This patient was seen in conjunction with Dorothy Adkins, physician assistant. This 43-year-old female presents with rip-roaring otitis externa on the right side, that is actually spreading outside of the canal into the outer ear. My PA has a photo posted of the extent of this under the \"media\" section of this chart using [employer]. We have her on both Augmentin and Ciprodex at this point. My [de-identified] assistant placed an ear wick on the right side. My PA has contacted Dr. Jones Blunt office to arrange for ENT  follow-up within the next 48 hours. Patient is otherwise clinically stable.            [address] DO  01/22/21 1200

## 2021-01-25 ENCOUNTER — TELEPHONE (OUTPATIENT)
Dept: ENT CLINIC | Age: 46
End: 2021-01-25

## 2021-01-25 ENCOUNTER — OFFICE VISIT (OUTPATIENT)
Dept: ENT CLINIC | Age: 46
End: 2021-01-25
Payer: COMMERCIAL

## 2021-01-25 VITALS
HEART RATE: 72 BPM | WEIGHT: 141 LBS | DIASTOLIC BLOOD PRESSURE: 70 MMHG | RESPIRATION RATE: 16 BRPM | TEMPERATURE: 98.8 F | BODY MASS INDEX: 24.98 KG/M2 | SYSTOLIC BLOOD PRESSURE: 122 MMHG

## 2021-01-25 DIAGNOSIS — H60.313 ACUTE DIFFUSE OTITIS EXTERNA OF BOTH EARS: Primary | ICD-10-CM

## 2021-01-25 LAB
AEROBIC CULTURE: ABNORMAL
AEROBIC CULTURE: ABNORMAL
GRAM STAIN RESULT: ABNORMAL
ORGANISM: ABNORMAL

## 2021-01-25 PROCEDURE — 99212 OFFICE O/P EST SF 10 MIN: CPT | Performed by: NURSE PRACTITIONER

## 2021-01-25 RX ORDER — CIPROFLOXACIN HYDROCHLORIDE 3.5 MG/ML
SOLUTION/ DROPS TOPICAL
Qty: 1 BOTTLE | Refills: 1 | Status: SHIPPED | OUTPATIENT
Start: 2021-01-25 | End: 2021-02-01

## 2021-01-25 NOTE — PROGRESS NOTES
[facility] [place] [facility]  [facility], [facility]SE [facility]  [address] [address]  Dept: [phone]  Dept Fax: [phone]  Loc: [phone]     Bee Rivera is a 41 y.o. female who was referred by No ref. provider found for:  Chief Complaint   Patient presents with    Follow-up     Patient here for removal of otowick. Myriam Ownes HPI:     Bee Rivera is a 41 y.o. female here for follow up regarding bilateral otitis externa. She had otowicks placed in ear ear 3 days ago with Dr Itzel Padilla. Currently on Ciprodex and Augmentin. Keeping water out of ears. Culture was taken, still pending. History:     No Known Allergies  Current Outpatient Medications   Medication Sig Dispense Refill    naproxen (NAPROSYN) 500 MG tablet Take 1 tablet by mouth 2 times daily Do not take with ibuprofen, advil, motrin, or aleve. 60 tablet 0    PARoxetine (PAXIL) 10 MG tablet Take 1 tablet by mouth daily 30 tablet 3    meclizine (ANTIVERT) 25 MG tablet Take 1/2 tablet to 1 full tablet by mouth three times a day prn for dizziness (Patient not taking: Reported on 2/1/2021) 15 tablet 0     No current facility-administered medications for this visit.       Past Medical History:   Diagnosis Date    Abnormal Pap smear     Arthritis     Depression     Mental disorder     history of depression      Past Surgical History:   Procedure Laterality Date    COLONOSCOPY Left 12/29/2017    COLONOSCOPY WITH BIOPSY performed by Maria A Lara MD at [facility]  2007    [place] EGD TRANSORAL BIOPSY SINGLE/MULTIPLE Left 12/29/2017    EGD BIOPSY performed by Maria A [facility]ara MD at [facility]     Family History   Problem Relation Age of Onset    Miscarriages / Stillbirths Maternal Grandmother     Early Death Neg Hx      Social History     Tobacco Use    Smoking status: Current Every Day Smoker     Packs/day: 0.50     Years: 20.00     Pack years: 10.00 Types: Cigarettes    Smokeless tobacco: Never Used   Substance Use Topics    Alcohol use: Yes     Comment: patient state \"a couple of beers a week\"        Subjective:      Review of Systems  Rest of review of systems are negative, except as noted in HPI. Objective:     /70 (Site: Left Upper Arm, Position: Sitting)   Pulse 72   Temp 98.8 °F (37.1 °C) (Infrared)   Resp 16   Wt 141 lb (64 kg)   BMI 24.98 kg/m²     PHYSICAL EXAM  Constitutional: Oriented to person, place, and time. appears well-developed and well-nourished. No distress. HENT:   Head: Normocephalic and atraumatic. Right Ear:  External ear normal. Otowick removed. Small amount of debris and desquamated skin suctioned from canal.  Canal with mild erythema and swelling. Tympanic membrane intact. Middle ear aerated. Left Ear:  External ear normal. Otowick removed. Small amount of debris and desquamated skin suctioned from canal.  Canal with mild erythema and swelling. Tympanic membrane intact. Middle ear aerated. Vitals reviewed. Data:  All of the past medical history, past surgical history, family history,social history, allergies and current medications were reviewed with the patient. Assessment & Plan   Diagnoses and all orders for this visit:     Diagnosis Orders   1. Acute diffuse otitis externa of both ears         The findings were explained and her questions were answered. Finish course of Augmentin. Stop Ciprodex, change to plain Ciloxan drops. Await final culture - will contact patient. Strictly keep water out of ears. FU appt 1 week. Return in about 1 week (around 2/1/2021). **This report has been created using voice recognition software. It may contain minor errors which are inherent in voice recognition technology. **

## 2021-01-25 NOTE — TELEPHONE ENCOUNTER
Final ear culture grew Pseudomonas - typically from water. Continue with same plan of care as discussed this morning, no changes to antibiotics.

## 2021-02-01 ENCOUNTER — OFFICE VISIT (OUTPATIENT)
Dept: ENT CLINIC | Age: 46
End: 2021-02-01
Payer: COMMERCIAL

## 2021-02-01 VITALS
TEMPERATURE: 98.4 F | RESPIRATION RATE: 16 BRPM | DIASTOLIC BLOOD PRESSURE: 82 MMHG | BODY MASS INDEX: 24.45 KG/M2 | HEART RATE: 68 BPM | SYSTOLIC BLOOD PRESSURE: 128 MMHG | WEIGHT: 138 LBS

## 2021-02-01 DIAGNOSIS — H60.313 ACUTE DIFFUSE OTITIS EXTERNA OF BOTH EARS: Primary | ICD-10-CM

## 2021-02-01 PROCEDURE — 99211 OFF/OP EST MAY X REQ PHY/QHP: CPT | Performed by: NURSE PRACTITIONER

## 2021-02-01 NOTE — PROGRESS NOTES
[facility] [place] [facility]  [facility] EAR, NOSE AND THROAT  [address] [address]  Dept: [phone]  Dept Fax: [phone]  Loc: [phone]    Cody Lee is a 41 y.o. female who was referred by No ref. provider found for:  Chief Complaint   Patient presents with    Follow-up     Patient here for follow up for her otitis externa, bilateral.    . HPI:     Cody Lee is a 41 y.o. female here for 1 week follow up regarding bilateral otitis externa. Ear culture grew Pseudomonas. Patient completed course of Augmentin and Ciloxan drops. Keeping water out of ears. No complaints today. Hearing has returned to normal.     History:     No Known Allergies  Current Outpatient Medications   Medication Sig Dispense Refill    naproxen (NAPROSYN) 500 MG tablet Take 1 tablet by mouth 2 times daily Do not take with ibuprofen, advil, motrin, or aleve. 60 tablet 0    PARoxetine (PAXIL) 10 MG tablet Take 1 tablet by mouth daily 30 tablet 3    meclizine (ANTIVERT) 25 MG tablet Take 1/2 tablet to 1 full tablet by mouth three times a day prn for dizziness (Patient not taking: Reported on 2/1/2021) 15 tablet 0     No current facility-administered medications for this visit.       Past Medical History:   Diagnosis Date    Abnormal Pap smear     Arthritis     Depression     Mental disorder     history of depression      Past Surgical History:   Procedure Laterality Date    COLONOSCOPY Left 12/29/2017    COLONOSCOPY WITH BIOPSY performed by Marcello Ayala MD at [address] [facility]  2007    [facility] TRANSORAL BIOPSY SINGLE/MULTIPLE Left 12/29/2017    EGD BIOPSY performed by Marcello [facility]ala MD at [facility]     Family History   Problem Relation Age of Onset    Miscarriages / Stillbirths Maternal Grandmother     Early Death Neg Hx      Social History     Tobacco Use    Smoking status: Current Every Day Smoker     Packs/day: 0.50 Years: 20.00     Pack years: 10.00     Types: Cigarettes    Smokeless tobacco: Never Used   Substance Use Topics    Alcohol use: Yes     Comment: patient state \"a couple of beers a week\"        Subjective:      Review of Systems  Rest of review of systems are negative, except as noted in HPI. Objective:     /82 (Site: Right Upper Arm, Position: Sitting)   Pulse 68   Temp 98.4 °F (36.9 °C) (Infrared)   Resp 16   Wt 138 lb (62.6 kg)   BMI 24.45 kg/m²     PHYSICAL EXAM  Constitutional: Oriented to person, place, and time. appears well-developed and well-nourished. No distress. HENT:   Head: Normocephalic and atraumatic. Right Ear:  External ear normal. Tympanic membrane intact. Middle ear aerated. Left Ear:  External ear normal. Tympanic membrane intact. Middle ear aerated. Vitals reviewed. Data:  All of the past medical history, past surgical history, family history,social history, allergies and current medications were reviewed with the patient. Assessment & Plan   Diagnoses and all orders for this visit:     Diagnosis Orders   1. Acute diffuse otitis externa of both ears         The findings were explained and her questions were answered. Recommendations as follows:  Preventing \"swimmer's ear\":  - Use ear plugs or cotton balls with vaseline in the ears when taking a shower or washing hair  - Use a blow dryer on a low setting to the ears after showering to dry any water from the ears  - Avoid scratching the ears-it will only make them itch more and introduce infection  - May use over the counter hydrocortisone cream to around ear opening  - Mix half/half mixture of rubbing alcohol and white distilled vinegar. Use periodically in ear to help kill any bacteria, help with itching and reset natural pH in ear canal.      Return if symptoms worsen or fail to improve. **This report has been created using voice recognition software.  It may contain minor errors which are inherent in voice recognition technology. **

## 2021-02-01 NOTE — PATIENT INSTRUCTIONS
Preventing \"swimmer's ear\":  - Use ear plugs or cotton balls with vaseline in the ears when taking a shower or washing hair  - Use a blow dryer on a low setting to the ears after showering to dry any water from the ears  - Avoid scratching the ears-it will only make them itch more and introduce infection  - May use over the counter hydrocortisone cream to around ear opening  - Mix half/half mixture of rubbing alcohol and white distilled vinegar.   Use periodically in ear to help kill any bacteria, help with itching and reset natural pH in ear canal.

## 2024-07-30 ENCOUNTER — OFFICE VISIT (OUTPATIENT)
Dept: FAMILY MEDICINE CLINIC | Age: 49
End: 2024-07-30
Payer: COMMERCIAL

## 2024-07-30 VITALS
DIASTOLIC BLOOD PRESSURE: 70 MMHG | RESPIRATION RATE: 12 BRPM | WEIGHT: 141.4 LBS | SYSTOLIC BLOOD PRESSURE: 124 MMHG | OXYGEN SATURATION: 98 % | HEIGHT: 63 IN | BODY MASS INDEX: 25.05 KG/M2 | TEMPERATURE: 98.2 F | HEART RATE: 74 BPM

## 2024-07-30 DIAGNOSIS — K58.0 IRRITABLE BOWEL SYNDROME WITH DIARRHEA: ICD-10-CM

## 2024-07-30 DIAGNOSIS — Z76.89 ENCOUNTER TO ESTABLISH CARE: Primary | ICD-10-CM

## 2024-07-30 DIAGNOSIS — Z12.31 ENCOUNTER FOR SCREENING MAMMOGRAM FOR MALIGNANT NEOPLASM OF BREAST: ICD-10-CM

## 2024-07-30 DIAGNOSIS — F41.9 ANXIETY: ICD-10-CM

## 2024-07-30 PROCEDURE — 99204 OFFICE O/P NEW MOD 45 MIN: CPT | Performed by: NURSE PRACTITIONER

## 2024-07-30 RX ORDER — PAROXETINE 10 MG/1
10 TABLET, FILM COATED ORAL DAILY
Qty: 30 TABLET | Refills: 1 | Status: SHIPPED | OUTPATIENT
Start: 2024-07-30

## 2024-07-30 SDOH — ECONOMIC STABILITY: FOOD INSECURITY: WITHIN THE PAST 12 MONTHS, YOU WORRIED THAT YOUR FOOD WOULD RUN OUT BEFORE YOU GOT MONEY TO BUY MORE.: NEVER TRUE

## 2024-07-30 SDOH — ECONOMIC STABILITY: INCOME INSECURITY: HOW HARD IS IT FOR YOU TO PAY FOR THE VERY BASICS LIKE FOOD, HOUSING, MEDICAL CARE, AND HEATING?: NOT HARD AT ALL

## 2024-07-30 SDOH — ECONOMIC STABILITY: FOOD INSECURITY: WITHIN THE PAST 12 MONTHS, THE FOOD YOU BOUGHT JUST DIDN'T LAST AND YOU DIDN'T HAVE MONEY TO GET MORE.: NEVER TRUE

## 2024-07-30 SDOH — ECONOMIC STABILITY: HOUSING INSECURITY
IN THE LAST 12 MONTHS, WAS THERE A TIME WHEN YOU DID NOT HAVE A STEADY PLACE TO SLEEP OR SLEPT IN A SHELTER (INCLUDING NOW)?: NO

## 2024-07-30 ASSESSMENT — PATIENT HEALTH QUESTIONNAIRE - PHQ9
8. MOVING OR SPEAKING SO SLOWLY THAT OTHER PEOPLE COULD HAVE NOTICED. OR THE OPPOSITE, BEING SO FIGETY OR RESTLESS THAT YOU HAVE BEEN MOVING AROUND A LOT MORE THAN USUAL: SEVERAL DAYS
3. TROUBLE FALLING OR STAYING ASLEEP: SEVERAL DAYS
SUM OF ALL RESPONSES TO PHQ QUESTIONS 1-9: 8
SUM OF ALL RESPONSES TO PHQ QUESTIONS 1-9: 8
2. FEELING DOWN, DEPRESSED OR HOPELESS: NEARLY EVERY DAY
SUM OF ALL RESPONSES TO PHQ9 QUESTIONS 1 & 2: 5
10. IF YOU CHECKED OFF ANY PROBLEMS, HOW DIFFICULT HAVE THESE PROBLEMS MADE IT FOR YOU TO DO YOUR WORK, TAKE CARE OF THINGS AT HOME, OR GET ALONG WITH OTHER PEOPLE: NOT DIFFICULT AT ALL
6. FEELING BAD ABOUT YOURSELF - OR THAT YOU ARE A FAILURE OR HAVE LET YOURSELF OR YOUR FAMILY DOWN: NOT AT ALL
SUM OF ALL RESPONSES TO PHQ QUESTIONS 1-9: 8
1. LITTLE INTEREST OR PLEASURE IN DOING THINGS: MORE THAN HALF THE DAYS
4. FEELING TIRED OR HAVING LITTLE ENERGY: NOT AT ALL
9. THOUGHTS THAT YOU WOULD BE BETTER OFF DEAD, OR OF HURTING YOURSELF: NOT AT ALL
SUM OF ALL RESPONSES TO PHQ QUESTIONS 1-9: 8
5. POOR APPETITE OR OVEREATING: NOT AT ALL

## 2024-07-30 NOTE — PROGRESS NOTES
Alert, 5/5 strength globally and symmetrically  Psych: Affect appropriate.  Mood normal. Thought process is normal without evidence of depression or psychosis. Good insight and appropriate interaction.  Cognition and memory appear to be intact.  Skin: warm and dry, no rash or erythema  Lymph:  No cervical, auricular or supraclavicular lymph nodes palpated    ASSESSMENT & PLAN  Hannah was seen today for diarrhea.    Diagnoses and all orders for this visit:    Encounter to establish care    Anxiety  -     PARoxetine (PAXIL) 10 MG tablet; Take 1 tablet by mouth daily    Irritable bowel syndrome with diarrhea  -     Comprehensive Metabolic Panel; Future  -     CBC with Auto Differential; Future  -     TSH With Reflex Ft4; Future  -     Celiac Disease Panel; Future  -     C-Reactive Protein; Future  -     Calprotectin Stool; Future    Encounter for screening mammogram for malignant neoplasm of breast  -     WENDY CARLOS DIGITAL SCREEN BILATERAL; Future      - No alarm features to her symptoms  - unlikely infectious since she has had some formed stools  - I really suspect this is IBS diarrhea type, stress/anxiety major trigger  - obtain above labs  - resume Paxil for anxiety  - ok for imodium for diarrhea    DISPOSITION    Return in about 4 weeks (around 8/27/2024) for diarrhea, anxiety.    Hannah released without restrictions.      PATIENT COUNSELING    Counseling was provided today regarding the following topics: Healthy eating habits, Regular exercise, substance abuse and healthy sleep habits.    Hannah received counseling on the following healthy behaviors: medication adherence    Patient given educational materials on: See Attached    I have instructed Hannah to complete a self tracking handout on none and instructed them to bring it with them to her next appointment.     Barriers to learning and self management: none    Discussed use, benefit, and side effects of prescribed medications.  Barriers to

## 2024-07-31 ENCOUNTER — LAB (OUTPATIENT)
Dept: LAB | Age: 49
End: 2024-07-31

## 2024-07-31 DIAGNOSIS — K58.0 IRRITABLE BOWEL SYNDROME WITH DIARRHEA: ICD-10-CM

## 2024-08-05 LAB — CALPROTECTIN STL-MCNT: 1520 UG/G

## 2024-08-06 ENCOUNTER — TELEPHONE (OUTPATIENT)
Dept: FAMILY MEDICINE CLINIC | Age: 49
End: 2024-08-06

## 2024-08-06 DIAGNOSIS — R19.5 ELEVATED FECAL CALPROTECTIN: ICD-10-CM

## 2024-08-06 DIAGNOSIS — R19.7 DIARRHEA, UNSPECIFIED TYPE: Primary | ICD-10-CM

## 2024-08-06 NOTE — TELEPHONE ENCOUNTER
----- Message from Derrick Garcia APRN - CNP sent at 8/6/2024  8:23 AM EDT -----  Let Hannah know her stool test was very elevated. This specific stool test is basically a marker of inflammation within the bowel. It was very high. She needs to see a GI doctor and have repeat scopes done to rule out an inflammatory bowel disease. I have placed a referral to Marvin, who is who did her first colonoscopy.     How is the diarrhea? Any improvement?

## 2024-08-06 NOTE — TELEPHONE ENCOUNTER
Pt informed and understanding with no further questions at this time.     She said she improved but went backwards and started back with the diarrhea yesterday.

## 2024-08-26 ENCOUNTER — OFFICE VISIT (OUTPATIENT)
Dept: FAMILY MEDICINE CLINIC | Age: 49
End: 2024-08-26
Payer: COMMERCIAL

## 2024-08-26 VITALS
HEART RATE: 69 BPM | RESPIRATION RATE: 20 BRPM | OXYGEN SATURATION: 98 % | TEMPERATURE: 98.2 F | BODY MASS INDEX: 25.2 KG/M2 | WEIGHT: 142.2 LBS | DIASTOLIC BLOOD PRESSURE: 84 MMHG | HEIGHT: 63 IN | SYSTOLIC BLOOD PRESSURE: 130 MMHG

## 2024-08-26 DIAGNOSIS — R19.7 DIARRHEA, UNSPECIFIED TYPE: Primary | ICD-10-CM

## 2024-08-26 DIAGNOSIS — F41.9 ANXIETY: ICD-10-CM

## 2024-08-26 DIAGNOSIS — Z13.220 SCREENING FOR HYPERLIPIDEMIA: ICD-10-CM

## 2024-08-26 PROCEDURE — 99214 OFFICE O/P EST MOD 30 MIN: CPT | Performed by: NURSE PRACTITIONER

## 2024-08-26 RX ORDER — PAROXETINE 10 MG/1
10 TABLET, FILM COATED ORAL DAILY
Qty: 90 TABLET | Refills: 1 | Status: SHIPPED | OUTPATIENT
Start: 2024-08-26

## 2024-08-26 NOTE — PROGRESS NOTES
Chief Complaint   Patient presents with    Anxiety     States anxiety is better. States still having diarrhea. States comes and goes. Has been taking Imodium but doesn't take it all the time. States goes to GI on Thursday to discuss colonoscopy.        History obtained from chart review and the patient.    SUBJECTIVE:  Hannah Smith is a 48 y.o. female that presents today for follow up diarrhea and anxiety    Diarrhea  Still having diarrhea on and off  Majority of stools are loose  No blood or black stools  Has appt with GI Thursday for consult  Had elevated fecal calprotectin level    Wt Readings from Last 3 Encounters:   08/26/24 64.5 kg (142 lb 3.2 oz)   07/30/24 64.1 kg (141 lb 6.4 oz)   02/01/21 62.6 kg (138 lb)     Anxiety  Anxiety has been good  She does feel like the Paxil helps  Feels like the anxiety is manageable    Age/Gender Health Maintenance    Lipid - No results found for: \"CHOL\", \"TRIG\", \"HDL\", \"LDL\", \"VLDL\", \"CHOLHDLRATIO\"  DM Screen - needs  Colon Cancer Screening - 12/29/17 [place] every 10 years  Lung Cancer Screening (Age 50 to 80 with 30 pack year hx, current smoker or quit within past 15 years) - 50    Tetanus - every 10 years  Influenza Vaccine - yearly  Pneumonia Vaccine - 65  Zostavax - 50     Breast Cancer Screening - 8/30/24  Cervical Cancer Screening - April of 2017  Osteoporosis Screening - 65  Chlamydia Screen - n/a      Current Outpatient Medications   Medication Sig Dispense Refill    PARoxetine (PAXIL) 10 MG tablet Take 1 tablet by mouth daily 90 tablet 1     No current facility-administered medications for this visit.     Orders Placed This Encounter   Medications    PARoxetine (PAXIL) 10 MG tablet     Sig: Take 1 tablet by mouth daily     Dispense:  90 tablet     Refill:  1         All medications reviewed and reconciled, including OTC and herbal medications. Updated list given to patient.       Patient Active Problem List   Diagnosis    Anxiety    Asymptomatic  Number of Places Lived in the Last Year: Not on file     Unstable Housing in the Last Year: No       Family History   Problem Relation Age of Onset    Miscarriages / Stillbirths Maternal Grandmother     Early Death Neg Hx          I have reviewed the patient's past medical history, past surgical history, allergies, medications, social and family history and I have made updates where appropriate.      Review of Systems  Positive responses are highlighted in bold    Constitutional:  Fever, Chills, Night Sweats, Fatigue, Unexpected changes in weight  Eyes:  Eye discharge, Eye pain, Eye redness, Visual disturbances   HENT:  Ear pain, Tinnitus, Nosebleeds, Trouble swallowing, Hearing loss, Sore throat  Cardiovascular:  Chest Pain, Palpitations, Orthopnea, Paroxysmal Nocturnal Dyspnea  Respiratory:  Cough, Wheezing, Shortness of breath, Chest tightness, Apnea  Gastrointestinal:  Nausea, Vomiting, Diarrhea, Constipation, Heartburn, Blood in stool  Genitourinary:  Difficulty or painful urination, Flank pain, Change in frequency, Urgency  Skin:  Color change, Rash, Itching, Wound  Psychiatric:  Hallucinations, Anxiety, Depression, Suicidal ideation  Hematological:  Enlarged glands, Easy bleeding, Easily bruising  Musculoskeletal:  Joint pain, Back pain, Gait problems, Joint swelling, Myalgias  Neurological:  Dizziness, Headaches, Presyncope, Numbness, Seizures, Tremors  Allergy:  Environmental allergies, Food allergies  Endocrine:  Heat Intolerance, Cold Intolerance, Polydipsia, Polyphagia, Polyuria    Lab Results   Component Value Date     09/03/2020    K 3.8 09/03/2020     09/03/2020    CO2 22 (L) 09/03/2020    BUN 12 09/03/2020    CREATININE 0.6 09/03/2020    GLUCOSE 93 09/03/2020    CALCIUM 9.3 09/03/2020    BILITOT 0.5 09/03/2020    ALKPHOS 67 09/03/2020    AST 17 09/03/2020    ALT 12 09/03/2020    LABGLOM >90 09/03/2020       PHYSICAL EXAM:  Vitals:    08/26/24 0842   BP: 130/84   Pulse: 69   Resp: 20

## 2024-08-27 ENCOUNTER — LAB (OUTPATIENT)
Dept: LAB | Age: 49
End: 2024-08-27

## 2024-08-27 DIAGNOSIS — Z13.220 SCREENING FOR HYPERLIPIDEMIA: ICD-10-CM

## 2024-08-27 DIAGNOSIS — K58.0 IRRITABLE BOWEL SYNDROME WITH DIARRHEA: ICD-10-CM

## 2024-08-27 LAB
ALBUMIN SERPL BCG-MCNC: 3.9 G/DL (ref 3.5–5.1)
ALP SERPL-CCNC: 83 U/L (ref 38–126)
ALT SERPL W/O P-5'-P-CCNC: 16 U/L (ref 11–66)
ANION GAP SERPL CALC-SCNC: 7 MEQ/L (ref 8–16)
AST SERPL-CCNC: 14 U/L (ref 5–40)
BASOPHILS ABSOLUTE: 0 THOU/MM3 (ref 0–0.1)
BASOPHILS NFR BLD AUTO: 0.4 %
BILIRUB SERPL-MCNC: 0.2 MG/DL (ref 0.3–1.2)
BUN SERPL-MCNC: 12 MG/DL (ref 7–22)
CALCIUM SERPL-MCNC: 8.7 MG/DL (ref 8.5–10.5)
CHLORIDE SERPL-SCNC: 103 MEQ/L (ref 98–111)
CHOLEST SERPL-MCNC: 135 MG/DL (ref 100–199)
CO2 SERPL-SCNC: 26 MEQ/L (ref 23–33)
CREAT SERPL-MCNC: 0.5 MG/DL (ref 0.4–1.2)
CRP SERPL-MCNC: < 0.3 MG/DL (ref 0–1)
DEPRECATED RDW RBC AUTO: 47.3 FL (ref 35–45)
EOSINOPHIL NFR BLD AUTO: 3.7 %
EOSINOPHILS ABSOLUTE: 0.3 THOU/MM3 (ref 0–0.4)
ERYTHROCYTE [DISTWIDTH] IN BLOOD BY AUTOMATED COUNT: 13.3 % (ref 11.5–14.5)
GFR SERPL CREATININE-BSD FRML MDRD: > 90 ML/MIN/1.73M2
GLUCOSE SERPL-MCNC: 112 MG/DL (ref 70–108)
HCT VFR BLD AUTO: 37.7 % (ref 37–47)
HDLC SERPL-MCNC: 38 MG/DL
HGB BLD-MCNC: 12.6 GM/DL (ref 12–16)
IMM GRANULOCYTES # BLD AUTO: 0.02 THOU/MM3 (ref 0–0.07)
IMM GRANULOCYTES NFR BLD AUTO: 0.2 %
LDLC SERPL CALC-MCNC: 84 MG/DL
LYMPHOCYTES ABSOLUTE: 2.2 THOU/MM3 (ref 1–4.8)
LYMPHOCYTES NFR BLD AUTO: 27.7 %
MCH RBC QN AUTO: 32.3 PG (ref 26–33)
MCHC RBC AUTO-ENTMCNC: 33.4 GM/DL (ref 32.2–35.5)
MCV RBC AUTO: 96.7 FL (ref 81–99)
MONOCYTES ABSOLUTE: 0.4 THOU/MM3 (ref 0.4–1.3)
MONOCYTES NFR BLD AUTO: 5.2 %
NEUTROPHILS ABSOLUTE: 5 THOU/MM3 (ref 1.8–7.7)
NEUTROPHILS NFR BLD AUTO: 62.8 %
NRBC BLD AUTO-RTO: 0 /100 WBC
PLATELET # BLD AUTO: 277 THOU/MM3 (ref 130–400)
PMV BLD AUTO: 11.1 FL (ref 9.4–12.4)
POTASSIUM SERPL-SCNC: 3.9 MEQ/L (ref 3.5–5.2)
PROT SERPL-MCNC: 6.2 G/DL (ref 6.1–8)
RBC # BLD AUTO: 3.9 MILL/MM3 (ref 4.2–5.4)
SODIUM SERPL-SCNC: 136 MEQ/L (ref 135–145)
TRIGL SERPL-MCNC: 67 MG/DL (ref 0–199)
TSH SERPL DL<=0.005 MIU/L-ACNC: 1.08 UIU/ML (ref 0.4–4.2)
WBC # BLD AUTO: 8 THOU/MM3 (ref 4.8–10.8)

## 2024-08-28 ENCOUNTER — TELEPHONE (OUTPATIENT)
Dept: FAMILY MEDICINE CLINIC | Age: 49
End: 2024-08-28

## 2024-08-28 NOTE — TELEPHONE ENCOUNTER
----- Message from TAURUS Kilgore - CNP sent at 8/28/2024  7:52 AM EDT -----  Let Chelsea know so far her labs all look ok, in appropriate ranges. Still waiting on 1 to come back

## 2024-08-30 ENCOUNTER — HOSPITAL ENCOUNTER (OUTPATIENT)
Dept: MAMMOGRAPHY | Age: 49
Discharge: HOME | End: 2024-08-30
Payer: COMMERCIAL

## 2024-08-30 ENCOUNTER — TELEPHONE (OUTPATIENT)
Dept: FAMILY MEDICINE CLINIC | Age: 49
End: 2024-08-30

## 2024-08-30 DIAGNOSIS — Z12.31 ENCOUNTER FOR SCREENING MAMMOGRAM FOR MALIGNANT NEOPLASM OF BREAST: ICD-10-CM

## 2024-08-30 LAB
C CAYETANENSIS DNA SPEC QL NAA+PROBE: NOT DETECTED
CAMPY SP DNA.DIARRHEA STL QL NAA+PROBE: NOT DETECTED
CRYPTOSP DNA SPEC QL NAA+PROBE: NOT DETECTED
E COLI O157H7 DNA SPEC QL NAA+PROBE: ABNORMAL
E HISTOLYT DNA SPEC QL NAA+PROBE: NOT DETECTED
EAEC PAA PLAS AGGR+AATA ST NAA+NON-PRB: NOT DETECTED
EC STX1+STX2 + H7 FLIC SPEC NAA+PROBE: NOT DETECTED
EPEC EAE GENE STL QL NAA+NON-PROBE: NOT DETECTED
ETEC LTA+ST1A+ST1B TOX ST NAA+NON-PROBE: NOT DETECTED
G LAMBLIA DNA SPEC QL NAA+PROBE: DETECTED
GLIADIN PEPTIDE IGA SER IA-ACNC: < 0.72 FLU (ref 0–4.99)
GLIADIN PEPTIDE IGG SER IA-ACNC: 0.82 FLU (ref 0–4.99)
HADV DNA SPEC QL NAA+PROBE: NOT DETECTED
HASTV RNA SPEC QL NAA+PROBE: NOT DETECTED
NOROVIRUS GI + GII RNA STL NAA+PROBE: NOT DETECTED
P SHIGELLOIDES DNA STL QL NAA+PROBE: NOT DETECTED
RV RNA SPEC QL NAA+PROBE: NOT DETECTED
SALMONELLA DNA SPEC QL NAA+PROBE: NOT DETECTED
SAPOVIRUS RNA SPEC QL NAA+PROBE: NOT DETECTED
SHIGELLA SP+EIEC IPAH ST NAA+NON-PROBE: NOT DETECTED
TTG IGA SER IA-ACNC: < 1.02 FLU (ref 0–4.99)
TTG IGG SER IA-ACNC: < 0.82 FLU (ref 0–4.99)
V CHOLERAE DNA SPEC QL NAA+PROBE: NOT DETECTED
VIBRIO DNA SPEC NAA+PROBE: NOT DETECTED
Y ENTERO RECN STL QL NAA+PROBE: NOT DETECTED

## 2024-08-30 PROCEDURE — 83993 ASSAY FOR CALPROTECTIN FECAL: CPT

## 2024-08-30 PROCEDURE — 87507 IADNA-DNA/RNA PROBE TQ 12-25: CPT

## 2024-08-30 PROCEDURE — 77063 BREAST TOMOSYNTHESIS BI: CPT

## 2024-08-30 PROCEDURE — 87493 C DIFF AMPLIFIED PROBE: CPT

## 2024-08-30 NOTE — TELEPHONE ENCOUNTER
----- Message from TAURUS Kilgore - CNP sent at 8/30/2024  8:58 AM EDT -----  Let Chelsea know the rest of her labs are normal.

## 2024-09-01 LAB — C DIFFICILE TOXINS, PCR: NORMAL

## 2024-09-03 ENCOUNTER — TELEPHONE (OUTPATIENT)
Dept: FAMILY MEDICINE CLINIC | Age: 49
End: 2024-09-03

## 2024-09-03 LAB — CALPROTECTIN STL-MCNT: 175 UG/G

## 2024-09-03 NOTE — TELEPHONE ENCOUNTER
----- Message from TAURUS Kilgore CNP sent at 9/3/2024  8:45 AM EDT -----  Let Chelsea know her mammogram is normal, no evidence of malignancy. Her breast tissue is very dense and she could be a candidate for an automated whole breast US for further screening. It's up to her if she'd like to proceed with it. Also, her lab test for celiac disease is negative.

## 2024-09-03 NOTE — TELEPHONE ENCOUNTER
Patient informed and verbalized understanding. Patient wants to think about the ABUS and call her insurance will call office if she decides to proceed with it. No questions at this time

## 2025-03-14 ENCOUNTER — OFFICE VISIT (OUTPATIENT)
Dept: FAMILY MEDICINE CLINIC | Age: 50
End: 2025-03-14
Payer: COMMERCIAL

## 2025-03-14 VITALS
SYSTOLIC BLOOD PRESSURE: 122 MMHG | DIASTOLIC BLOOD PRESSURE: 82 MMHG | BODY MASS INDEX: 29.45 KG/M2 | HEART RATE: 88 BPM | OXYGEN SATURATION: 99 % | TEMPERATURE: 97 F | WEIGHT: 166.2 LBS | HEIGHT: 63 IN | RESPIRATION RATE: 12 BRPM

## 2025-03-14 DIAGNOSIS — F41.9 ANXIETY: Primary | ICD-10-CM

## 2025-03-14 DIAGNOSIS — F33.1 MODERATE EPISODE OF RECURRENT MAJOR DEPRESSIVE DISORDER (HCC): ICD-10-CM

## 2025-03-14 DIAGNOSIS — N95.1 PERIMENOPAUSAL: ICD-10-CM

## 2025-03-14 PROCEDURE — 99214 OFFICE O/P EST MOD 30 MIN: CPT | Performed by: NURSE PRACTITIONER

## 2025-03-14 RX ORDER — PAROXETINE 20 MG/1
20 TABLET, FILM COATED ORAL DAILY
Qty: 30 TABLET | Refills: 2 | Status: SHIPPED | OUTPATIENT
Start: 2025-03-14

## 2025-03-14 SDOH — ECONOMIC STABILITY: FOOD INSECURITY: WITHIN THE PAST 12 MONTHS, YOU WORRIED THAT YOUR FOOD WOULD RUN OUT BEFORE YOU GOT MONEY TO BUY MORE.: NEVER TRUE

## 2025-03-14 SDOH — ECONOMIC STABILITY: FOOD INSECURITY: WITHIN THE PAST 12 MONTHS, THE FOOD YOU BOUGHT JUST DIDN'T LAST AND YOU DIDN'T HAVE MONEY TO GET MORE.: NEVER TRUE

## 2025-03-14 ASSESSMENT — PATIENT HEALTH QUESTIONNAIRE - PHQ9
SUM OF ALL RESPONSES TO PHQ QUESTIONS 1-9: 13
10. IF YOU CHECKED OFF ANY PROBLEMS, HOW DIFFICULT HAVE THESE PROBLEMS MADE IT FOR YOU TO DO YOUR WORK, TAKE CARE OF THINGS AT HOME, OR GET ALONG WITH OTHER PEOPLE: VERY DIFFICULT
9. THOUGHTS THAT YOU WOULD BE BETTER OFF DEAD, OR OF HURTING YOURSELF: NOT AT ALL
2. FEELING DOWN, DEPRESSED OR HOPELESS: SEVERAL DAYS
3. TROUBLE FALLING OR STAYING ASLEEP: NOT AT ALL
5. POOR APPETITE OR OVEREATING: NEARLY EVERY DAY
SUM OF ALL RESPONSES TO PHQ QUESTIONS 1-9: 13
6. FEELING BAD ABOUT YOURSELF - OR THAT YOU ARE A FAILURE OR HAVE LET YOURSELF OR YOUR FAMILY DOWN: NOT AT ALL
7. TROUBLE CONCENTRATING ON THINGS, SUCH AS READING THE NEWSPAPER OR WATCHING TELEVISION: NEARLY EVERY DAY
SUM OF ALL RESPONSES TO PHQ QUESTIONS 1-9: 13
SUM OF ALL RESPONSES TO PHQ QUESTIONS 1-9: 13
4. FEELING TIRED OR HAVING LITTLE ENERGY: NEARLY EVERY DAY
8. MOVING OR SPEAKING SO SLOWLY THAT OTHER PEOPLE COULD HAVE NOTICED. OR THE OPPOSITE, BEING SO FIGETY OR RESTLESS THAT YOU HAVE BEEN MOVING AROUND A LOT MORE THAN USUAL: NOT AT ALL
1. LITTLE INTEREST OR PLEASURE IN DOING THINGS: NEARLY EVERY DAY

## 2025-03-14 NOTE — PROGRESS NOTES
Chief Complaint   Patient presents with    Anxiety     States medication is helping. Also would like to discuss menopause. Sometimes wakes up soaked in sweat, gaining weight, not for sure if mood swings are from anxiety or this.        History obtained from chart review and the patient.    SUBJECTIVE:  Hannah Smith is a 49 y.o. female that presents today for follow up anxiety    Anxiety/MDD  Anxiety has been \" not bad\"  Still has a lot of stress, but the Paxil does help  Feels like the anxiety is manageable  Sometimes struggling with depression, maybe 2-3 day/week  Denies any SI/HI  Low motivation and energy    Wonders if premenopausal  Has night sweats maybe once a week, is gaining weight, and some mood swings  Denies any hot flashes  Has the Mirena and doesn't have periods-unsure how long it's been in there    Age/Gender Health Maintenance    Lipid -   Lab Results   Component Value Date    CHOL 135 08/27/2024    TRIG 67 08/27/2024    HDL 38 08/27/2024    LDL 84 08/27/2024     DM Screen - needs  Colon Cancer Screening - 12/29/17 [place] every 10 years  Lung Cancer Screening (Age 50 to 80 with 30 pack year hx, current smoker or quit within past 15 years) - 50    Tetanus - every 10 years  Influenza Vaccine - yearly  Pneumonia Vaccine - 65  Zostavax - 50     Breast Cancer Screening - 8/30/24  Cervical Cancer Screening - April of 2017  Osteoporosis Screening - 65  Chlamydia Screen - n/a      Current Outpatient Medications   Medication Sig Dispense Refill    PARoxetine (PAXIL) 20 MG tablet Take 1 tablet by mouth daily 30 tablet 2     No current facility-administered medications for this visit.     Orders Placed This Encounter   Medications    PARoxetine (PAXIL) 20 MG tablet     Sig: Take 1 tablet by mouth daily     Dispense:  30 tablet     Refill:  2         All medications reviewed and reconciled, including OTC and herbal medications. Updated list given to patient.       Patient Active Problem List

## 2025-05-02 ENCOUNTER — OFFICE VISIT (OUTPATIENT)
Dept: FAMILY MEDICINE CLINIC | Age: 50
End: 2025-05-02
Payer: COMMERCIAL

## 2025-05-02 VITALS
SYSTOLIC BLOOD PRESSURE: 128 MMHG | WEIGHT: 167.2 LBS | HEART RATE: 73 BPM | DIASTOLIC BLOOD PRESSURE: 68 MMHG | BODY MASS INDEX: 29.62 KG/M2 | RESPIRATION RATE: 12 BRPM | HEIGHT: 63 IN | OXYGEN SATURATION: 100 % | TEMPERATURE: 96.8 F

## 2025-05-02 DIAGNOSIS — N95.9 PREMENOPAUSAL PATIENT: Primary | ICD-10-CM

## 2025-05-02 DIAGNOSIS — F33.41 RECURRENT MAJOR DEPRESSIVE DISORDER, IN PARTIAL REMISSION: ICD-10-CM

## 2025-05-02 DIAGNOSIS — F41.9 ANXIETY: ICD-10-CM

## 2025-05-02 PROCEDURE — 99213 OFFICE O/P EST LOW 20 MIN: CPT | Performed by: NURSE PRACTITIONER

## 2025-05-02 RX ORDER — PAROXETINE 20 MG/1
20 TABLET, FILM COATED ORAL DAILY
Qty: 90 TABLET | Refills: 3 | Status: SHIPPED | OUTPATIENT
Start: 2025-05-02

## 2025-05-02 NOTE — PROGRESS NOTES
Chief Complaint   Patient presents with    Anxiety     No issues.        History obtained from chart review and the patient.    SUBJECTIVE:  Hannah Smith is a 49 y.o. female that presents today for follow up anxiety    Anxiety/MDD  Anxiety is doing ok  Still has her moments, but not like they were before  She does feel like the Paxil helps  Feels like the anxiety is manageable  Sometimes struggling with depression, maybe a couple times/week-not the whole day-usually in the evenings after work when she's alone  Denies any SI/HI    Premenopausal  Still having occasional night sweats, maybe 2-3 times/week  Still hasn't talked with OBGYN about getting Mirena out  Mood swings are good    Age/Gender Health Maintenance    Lipid -   Lab Results   Component Value Date    CHOL 135 08/27/2024    TRIG 67 08/27/2024    HDL 38 08/27/2024    LDL 84 08/27/2024     DM Screen - needs  Colon Cancer Screening - 12/29/17 [place] every 10 years  Lung Cancer Screening (Age 50 to 80 with 30 pack year hx, current smoker or quit within past 15 years) - 50    Tetanus - every 10 years  Influenza Vaccine - yearly  Pneumonia Vaccine - 65  Zostavax - 50     Breast Cancer Screening - 8/30/24  Cervical Cancer Screening - April of 2017  Osteoporosis Screening - 65  Chlamydia Screen - n/a      Current Outpatient Medications   Medication Sig Dispense Refill    PARoxetine (PAXIL) 20 MG tablet Take 1 tablet by mouth daily 90 tablet 3     No current facility-administered medications for this visit.     Orders Placed This Encounter   Medications    PARoxetine (PAXIL) 20 MG tablet     Sig: Take 1 tablet by mouth daily     Dispense:  90 tablet     Refill:  3         All medications reviewed and reconciled, including OTC and herbal medications. Updated list given to patient.       Patient Active Problem List   Diagnosis    Anxiety    Asymptomatic microscopic hematuria    Moderate episode of recurrent major depressive disorder (HCC)

## 2025-07-03 ENCOUNTER — HOSPITAL ENCOUNTER (EMERGENCY)
Age: 50
Discharge: HOME | End: 2025-07-03
Payer: COMMERCIAL

## 2025-07-03 ENCOUNTER — APPOINTMENT (OUTPATIENT)
Dept: GENERAL RADIOLOGY | Age: 50
End: 2025-07-03
Payer: COMMERCIAL

## 2025-07-03 VITALS
OXYGEN SATURATION: 98 % | HEART RATE: 94 BPM | SYSTOLIC BLOOD PRESSURE: 154 MMHG | DIASTOLIC BLOOD PRESSURE: 87 MMHG | RESPIRATION RATE: 20 BRPM | WEIGHT: 167 LBS | TEMPERATURE: 98.7 F | BODY MASS INDEX: 29.58 KG/M2
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
## 2025-07-03 DIAGNOSIS — S20.212A RIB CONTUSION, LEFT, INITIAL ENCOUNTER: Primary | ICD-10-CM

## 2025-07-03 DIAGNOSIS — S40.012A CONTUSION OF LEFT SHOULDER, INITIAL ENCOUNTER: ICD-10-CM

## 2025-07-03 PROCEDURE — 99213 OFFICE O/P EST LOW 20 MIN: CPT

## 2025-07-03 PROCEDURE — 71101 X-RAY EXAM UNILAT RIBS/CHEST: CPT

## 2025-07-03 PROCEDURE — 73030 X-RAY EXAM OF SHOULDER: CPT

## 2025-07-03 RX ORDER — LIDOCAINE 50 MG/G
1 PATCH TOPICAL DAILY
Qty: 10 PATCH | Refills: 0 | Status: SHIPPED | OUTPATIENT
Start: 2025-07-03 | End: 2025-07-13

## 2025-07-03 RX ORDER — CYCLOBENZAPRINE HCL 10 MG
10 TABLET ORAL 3 TIMES DAILY PRN
Qty: 30 TABLET | Refills: 0 | Status: SHIPPED | OUTPATIENT
Start: 2025-07-03 | End: 2025-07-13

## 2025-07-03 RX ORDER — IBUPROFEN 800 MG/1
800 TABLET, FILM COATED ORAL EVERY 6 HOURS PRN
COMMUNITY
Start: 2025-07-03

## 2025-07-03 ASSESSMENT — PAIN - FUNCTIONAL ASSESSMENT: PAIN_FUNCTIONAL_ASSESSMENT: 0-10

## 2025-07-03 ASSESSMENT — PAIN SCALES - GENERAL: PAINLEVEL_OUTOF10: 7

## 2025-07-03 ASSESSMENT — PAIN DESCRIPTION - LOCATION: LOCATION: SHOULDER;RIB CAGE

## 2025-07-03 ASSESSMENT — PAIN DESCRIPTION - PAIN TYPE: TYPE: ACUTE PAIN

## 2025-07-03 ASSESSMENT — PAIN DESCRIPTION - ORIENTATION: ORIENTATION: LEFT

## 2025-07-03 ASSESSMENT — PAIN DESCRIPTION - DESCRIPTORS: DESCRIPTORS: ACHING

## 2025-07-03 ASSESSMENT — PAIN DESCRIPTION - FREQUENCY: FREQUENCY: CONTINUOUS
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
## 2025-07-03 NOTE — DISCHARGE INSTRUCTIONS
Rest  Ice  Tylenol and motrin  Flexeril  Lidocaine patches  Follow up with PCP as needed  ER if symptoms worsen
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
## 2025-07-08 ENCOUNTER — OFFICE VISIT (OUTPATIENT)
Dept: FAMILY MEDICINE CLINIC | Age: 50
End: 2025-07-08

## 2025-07-08 VITALS
OXYGEN SATURATION: 99 % | WEIGHT: 174.8 LBS | TEMPERATURE: 96.8 F | HEIGHT: 63 IN | RESPIRATION RATE: 12 BRPM | HEART RATE: 84 BPM | DIASTOLIC BLOOD PRESSURE: 100 MMHG | BODY MASS INDEX: 30.97 KG/M2 | SYSTOLIC BLOOD PRESSURE: 158 MMHG

## 2025-07-08 DIAGNOSIS — S20.212A CONTUSION OF RIB ON LEFT SIDE, INITIAL ENCOUNTER: ICD-10-CM
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
## 2025-07-08 DIAGNOSIS — S40.012A CONTUSION OF LEFT SHOULDER, INITIAL ENCOUNTER: ICD-10-CM

## 2025-07-08 RX ORDER — KETOROLAC TROMETHAMINE 10 MG/1
10 TABLET, FILM COATED ORAL EVERY 6 HOURS PRN
Qty: 20 TABLET | Refills: 0 | Status: SHIPPED | OUTPATIENT
Start: 2025-07-08 | End: 2026-07-08

## 2025-07-08 NOTE — PROGRESS NOTES
glands, Easy bleeding, Easily bruising  Musculoskeletal:  Joint pain, Back pain, Gait problems, Joint swelling, Myalgias  Neurological:  Dizziness, Headaches, Presyncope, Numbness, Seizures, Tremors  Allergy:  Environmental allergies, Food allergies  Endocrine:  Heat Intolerance, Cold Intolerance, Polydipsia, Polyphagia, Polyuria    Lab Results   Component Value Date     08/27/2024    K 3.9 08/27/2024     08/27/2024    CO2 26 08/27/2024    BUN 12 08/27/2024    CREATININE 0.5 08/27/2024    GLUCOSE 112 (H) 08/27/2024    CALCIUM 8.7 08/27/2024    BILITOT 0.2 (L) 08/27/2024    ALKPHOS 83 08/27/2024    AST 14 08/27/2024    ALT 16 08/27/2024    LABGLOM > 90 08/27/2024     PROCEDURE: XR SHOULDER LEFT (MIN 2 VIEWS)  7/3/25     CLINICAL INFORMATION: Injury, Pain     COMPARISON: No prior study.     TECHNIQUE: 4 views of the left shoulder        FINDINGS:    No acute fracture or dislocation.     Bone mineralization is unremarkable.     The joint spaces are unremarkable.     No significant soft tissue abnormality.        IMPRESSION:  1. No acute bony abnormality    PROCEDURE: XR RIBS LEFT INCLUDE CHEST (MIN 3 VIEWS)  7/3/25     CLINICAL INFORMATION: Injury, Pain     COMPARISON: Chest radiograph 4/14/2007     TECHNIQUE: 5 views of the left ribs including PA view of the chest     FINDINGS:     The lungs are clear. No pleural effusions. No pneumothorax. No cardiomegaly.     No acute fracture or dislocation.     Bone mineralization is unremarkable.     The joint spaces are unremarkable.     No significant soft tissue abnormality.        IMPRESSION:  1. No acute bony abnormality    PHYSICAL EXAM:  Vitals:    07/08/25 0849   BP: (!) 158/100   Pulse: 84   Resp: 12   Temp: 96.8 °F (36 °C)   TempSrc: Temporal   SpO2: 99%   Weight: 79.3 kg (174 lb 12.8 oz)   Height: 1.6 m (5' 3\")       Body mass index is 30.96 kg/m².  Pain Score:   7      VS Reviewed  General Appearance: A&O x 3, No acute distress,well developed and well-

## (undated) DEVICE — TUBING IV STOPCOCK 48 CM 3 W

## (undated) DEVICE — FORCEPS BX L240CM JAW DIA3.2MM L CAP W/ NDL MIC MESH TOOTH